# Patient Record
Sex: FEMALE | Race: WHITE | NOT HISPANIC OR LATINO | Employment: UNEMPLOYED | URBAN - NONMETROPOLITAN AREA
[De-identification: names, ages, dates, MRNs, and addresses within clinical notes are randomized per-mention and may not be internally consistent; named-entity substitution may affect disease eponyms.]

---

## 2017-01-01 ENCOUNTER — HOSPITAL ENCOUNTER (EMERGENCY)
Facility: HOSPITAL | Age: 82
Discharge: HOME | End: 2017-04-16
Attending: EMERGENCY MEDICINE | Admitting: EMERGENCY MEDICINE

## 2017-01-01 ENCOUNTER — APPOINTMENT (OUTPATIENT)
Dept: CARDIOLOGY | Facility: HOSPITAL | Age: 82
End: 2017-01-01
Attending: INTERNAL MEDICINE

## 2017-01-01 ENCOUNTER — LAB REQUISITION (OUTPATIENT)
Dept: LAB | Facility: HOSPITAL | Age: 82
End: 2017-01-01

## 2017-01-01 ENCOUNTER — HOSPITAL ENCOUNTER (INPATIENT)
Facility: HOSPITAL | Age: 82
LOS: 4 days | Discharge: NURSING, REHAB OR LONG-TERM CARE | End: 2017-07-05
Attending: EMERGENCY MEDICINE | Admitting: INTERNAL MEDICINE

## 2017-01-01 ENCOUNTER — APPOINTMENT (OUTPATIENT)
Dept: GENERAL RADIOLOGY | Facility: HOSPITAL | Age: 82
End: 2017-01-01

## 2017-01-01 ENCOUNTER — APPOINTMENT (OUTPATIENT)
Dept: CT IMAGING | Facility: HOSPITAL | Age: 82
End: 2017-01-01

## 2017-01-01 ENCOUNTER — HOSPITAL ENCOUNTER (OUTPATIENT)
Dept: OTHER | Facility: HOSPITAL | Age: 82
Discharge: HOME | End: 2017-01-11
Attending: FAMILY MEDICINE | Admitting: FAMILY MEDICINE

## 2017-01-01 ENCOUNTER — APPOINTMENT (OUTPATIENT)
Dept: ULTRASOUND IMAGING | Facility: HOSPITAL | Age: 82
End: 2017-01-01

## 2017-01-01 VITALS
HEIGHT: 62 IN | SYSTOLIC BLOOD PRESSURE: 114 MMHG | DIASTOLIC BLOOD PRESSURE: 56 MMHG | WEIGHT: 97.6 LBS | BODY MASS INDEX: 17.96 KG/M2 | TEMPERATURE: 97 F | OXYGEN SATURATION: 95 % | RESPIRATION RATE: 18 BRPM | HEART RATE: 100 BPM

## 2017-01-01 VITALS
WEIGHT: 102.07 LBS | HEIGHT: 60 IN | BODY MASS INDEX: 20.04 KG/M2 | OXYGEN SATURATION: 93 % | TEMPERATURE: 97.8 F | HEART RATE: 90 BPM | DIASTOLIC BLOOD PRESSURE: 65 MMHG | RESPIRATION RATE: 16 BRPM | SYSTOLIC BLOOD PRESSURE: 143 MMHG

## 2017-01-01 DIAGNOSIS — Z74.09 IMPAIRED FUNCTIONAL MOBILITY, BALANCE, GAIT, AND ENDURANCE: ICD-10-CM

## 2017-01-01 DIAGNOSIS — N39.0 SEPSIS SECONDARY TO UTI (HCC): ICD-10-CM

## 2017-01-01 DIAGNOSIS — I26.99 PULMONARY EMBOLISM ON RIGHT (HCC): ICD-10-CM

## 2017-01-01 DIAGNOSIS — E86.0 DEHYDRATION: ICD-10-CM

## 2017-01-01 DIAGNOSIS — I82.412 ACUTE DEEP VEIN THROMBOSIS (DVT) OF LEFT FEMORAL VEIN (HCC): ICD-10-CM

## 2017-01-01 DIAGNOSIS — R53.1 WEAKNESS: Primary | ICD-10-CM

## 2017-01-01 DIAGNOSIS — N39.0 URINARY TRACT INFECTION: ICD-10-CM

## 2017-01-01 DIAGNOSIS — A41.9 SEPSIS SECONDARY TO UTI (HCC): ICD-10-CM

## 2017-01-01 DIAGNOSIS — I26.99 OTHER PULMONARY EMBOLISM WITHOUT ACUTE COR PULMONALE (HCC): ICD-10-CM

## 2017-01-01 DIAGNOSIS — I26.99 OTHER ACUTE PULMONARY EMBOLISM WITHOUT ACUTE COR PULMONALE (HCC): Primary | ICD-10-CM

## 2017-01-01 LAB
ALBUMIN SERPL-MCNC: 2.9 G/DL (ref 3.4–4.8)
ALBUMIN SERPL-MCNC: 3 G/DL (ref 3.4–4.8)
ALBUMIN SERPL-MCNC: 3.1 G/DL (ref 3.4–4.8)
ALBUMIN SERPL-MCNC: 3.7 G/DL (ref 3.4–4.8)
ALBUMIN/GLOB SERPL: 1.1 G/DL (ref 1.1–1.8)
ALBUMIN/GLOB SERPL: 1.2 G/DL (ref 1.1–1.8)
ALBUMIN/GLOB SERPL: 1.2 G/DL (ref 1.1–1.8)
ALBUMIN/GLOB SERPL: 1.7 G/DL (ref 1.1–1.8)
ALP SERPL-CCNC: 55 U/L (ref 38–126)
ALP SERPL-CCNC: 58 U/L (ref 38–126)
ALP SERPL-CCNC: 59 U/L (ref 38–126)
ALP SERPL-CCNC: 69 U/L (ref 38–126)
ALT SERPL W P-5'-P-CCNC: 21 U/L (ref 9–52)
ALT SERPL W P-5'-P-CCNC: 25 U/L (ref 9–52)
ALT SERPL W P-5'-P-CCNC: 31 U/L (ref 9–52)
ALT SERPL W P-5'-P-CCNC: 32 U/L (ref 9–52)
ANION GAP SERPL CALCULATED.3IONS-SCNC: 10 MMOL/L (ref 5–15)
ANION GAP SERPL CALCULATED.3IONS-SCNC: 11 MMOL/L (ref 5–15)
ANION GAP SERPL CALCULATED.3IONS-SCNC: 11 MMOL/L (ref 5–15)
ANION GAP SERPL CALCULATED.3IONS-SCNC: 8 MMOL/L (ref 5–15)
ANION GAP SERPL CALCULATED.3IONS-SCNC: 9 MMOL/L (ref 5–15)
APTT PPP: 26 SECONDS (ref 20–40.3)
AST SERPL-CCNC: 26 U/L (ref 14–36)
AST SERPL-CCNC: 30 U/L (ref 14–36)
AST SERPL-CCNC: 30 U/L (ref 14–36)
AST SERPL-CCNC: 37 U/L (ref 14–36)
BACTERIA SPEC AEROBE CULT: ABNORMAL
BACTERIA SPEC AEROBE CULT: NORMAL
BACTERIA SPEC AEROBE CULT: NORMAL
BACTERIA UR QL AUTO: ABNORMAL /HPF
BACTERIA UR QL AUTO: ABNORMAL /HPF
BASOPHILS # BLD AUTO: 0.02 10*3/MM3 (ref 0–0.2)
BASOPHILS # BLD AUTO: 0.03 10*3/MM3 (ref 0–0.2)
BASOPHILS # BLD AUTO: 0.03 10*3/MM3 (ref 0–0.2)
BASOPHILS # BLD AUTO: 0.04 X1000/UL (ref 0–0.2)
BASOPHILS # BLD AUTO: 0.05 10*3/MM3 (ref 0–0.2)
BASOPHILS NFR BLD AUTO: 0.2 % (ref 0–2)
BASOPHILS NFR BLD AUTO: 0.4 % (ref 0–2)
BASOPHILS NFR BLD AUTO: 0.6 % (ref 0–2)
BH CV ECHO MEAS - ACS: 2.6 CM
BH CV ECHO MEAS - AO MAX PG (FULL): 6.4 MMHG
BH CV ECHO MEAS - AO MAX PG: 12.4 MMHG
BH CV ECHO MEAS - AO MEAN PG (FULL): 2.4 MMHG
BH CV ECHO MEAS - AO MEAN PG: 6.2 MMHG
BH CV ECHO MEAS - AO ROOT AREA: 10 CM^2
BH CV ECHO MEAS - AO ROOT DIAM: 3.6 CM
BH CV ECHO MEAS - AO V2 MAX: 176.4 CM/SEC
BH CV ECHO MEAS - AO V2 MEAN: 110.6 CM/SEC
BH CV ECHO MEAS - AO V2 VTI: 25.3 CM
BH CV ECHO MEAS - AVA(I,A): 2.7 CM^2
BH CV ECHO MEAS - AVA(I,D): 2.7 CM^2
BH CV ECHO MEAS - AVA(V,A): 2.1 CM^2
BH CV ECHO MEAS - AVA(V,D): 2.1 CM^2
BH CV ECHO MEAS - EDV(CUBED): 43.7 ML
BH CV ECHO MEAS - EDV(TEICH): 51.6 ML
BH CV ECHO MEAS - EF(CUBED): 83 %
BH CV ECHO MEAS - EF(TEICH): 76.8 %
BH CV ECHO MEAS - ESV(CUBED): 7.4 ML
BH CV ECHO MEAS - ESV(TEICH): 12 ML
BH CV ECHO MEAS - FS: 44.6 %
BH CV ECHO MEAS - IVS/LVPW: 0.9
BH CV ECHO MEAS - IVSD: 0.81 CM
BH CV ECHO MEAS - LA DIMENSION: 2.3 CM
BH CV ECHO MEAS - LA/AO: 0.65
BH CV ECHO MEAS - LV MASS(C)D: 83.5 GRAMS
BH CV ECHO MEAS - LV MAX PG: 6.1 MMHG
BH CV ECHO MEAS - LV MEAN PG: 3.8 MMHG
BH CV ECHO MEAS - LV V1 MAX: 123.1 CM/SEC
BH CV ECHO MEAS - LV V1 MEAN: 91.5 CM/SEC
BH CV ECHO MEAS - LV V1 VTI: 23.2 CM
BH CV ECHO MEAS - LVIDD: 3.5 CM
BH CV ECHO MEAS - LVIDS: 2 CM
BH CV ECHO MEAS - LVOT AREA (M): 2.8 CM^2
BH CV ECHO MEAS - LVOT AREA: 3 CM^2
BH CV ECHO MEAS - LVOT DIAM: 1.9 CM
BH CV ECHO MEAS - LVPWD: 0.9 CM
BH CV ECHO MEAS - MR MAX PG: 99.8 MMHG
BH CV ECHO MEAS - MR MAX VEL: 499.5 CM/SEC
BH CV ECHO MEAS - MV A MAX VEL: 131.3 CM/SEC
BH CV ECHO MEAS - MV DEC SLOPE: 469.9 CM/SEC^2
BH CV ECHO MEAS - MV E MAX VEL: 91.3 CM/SEC
BH CV ECHO MEAS - MV E/A: 0.7
BH CV ECHO MEAS - MV MAX PG: 9.9 MMHG
BH CV ECHO MEAS - MV MEAN PG: 4.3 MMHG
BH CV ECHO MEAS - MV P1/2T MAX VEL: 103.4 CM/SEC
BH CV ECHO MEAS - MV P1/2T: 64.4 MSEC
BH CV ECHO MEAS - MV V2 MAX: 157.2 CM/SEC
BH CV ECHO MEAS - MV V2 MEAN: 97.4 CM/SEC
BH CV ECHO MEAS - MV V2 VTI: 21.3 CM
BH CV ECHO MEAS - MVA P1/2T LCG: 2.1 CM^2
BH CV ECHO MEAS - MVA(P1/2T): 3.4 CM^2
BH CV ECHO MEAS - MVA(VTI): 3.2 CM^2
BH CV ECHO MEAS - PA MAX PG: 3.2 MMHG
BH CV ECHO MEAS - PA V2 MAX: 89.6 CM/SEC
BH CV ECHO MEAS - RAP SYSTOLE: 10 MMHG
BH CV ECHO MEAS - RVDD: 1.9 CM
BH CV ECHO MEAS - SV(AO): 253.7 ML
BH CV ECHO MEAS - SV(CUBED): 36.2 ML
BH CV ECHO MEAS - SV(LVOT): 69 ML
BH CV ECHO MEAS - SV(TEICH): 39.6 ML
BILIRUB SERPL-MCNC: 0.2 MG/DL (ref 0.2–1.3)
BILIRUB SERPL-MCNC: 0.2 MG/DL (ref 0.2–1.3)
BILIRUB SERPL-MCNC: 0.3 MG/DL (ref 0.2–1.3)
BILIRUB SERPL-MCNC: 0.5 MG/DL (ref 0.2–1.3)
BILIRUB UR QL STRIP: NEGATIVE
BUN BLD-MCNC: 13 MG/DL (ref 7–21)
BUN BLD-MCNC: 15 MG/DL (ref 7–21)
BUN BLD-MCNC: 18 MG/DL (ref 7–21)
BUN BLD-MCNC: 7 MG/DL (ref 7–21)
BUN BLD-MCNC: 8 MG/DL (ref 7–21)
BUN/CREAT SERPL: 14.4 (ref 7–25)
BUN/CREAT SERPL: 15.8 (ref 7–25)
BUN/CREAT SERPL: 21.7 (ref 7–25)
BUN/CREAT SERPL: 8.9 (ref 7–25)
BUN/CREAT SERPL: 9.9 (ref 7–25)
CALCIUM SPEC-SCNC: 8.2 MG/DL (ref 8.4–10.2)
CALCIUM SPEC-SCNC: 8.3 MG/DL (ref 8.4–10.2)
CALCIUM SPEC-SCNC: 8.3 MG/DL (ref 8.4–10.2)
CALCIUM SPEC-SCNC: 8.5 MG/DL (ref 8.4–10.2)
CALCIUM SPEC-SCNC: 9.2 MG/DL (ref 8.4–10.2)
CHLORIDE SERPL-SCNC: 107 MMOL/L (ref 95–110)
CHLORIDE SERPL-SCNC: 109 MMOL/L (ref 95–110)
CHLORIDE SERPL-SCNC: 109 MMOL/L (ref 95–110)
CHLORIDE SERPL-SCNC: 110 MMOL/L (ref 95–110)
CHLORIDE SERPL-SCNC: 99 MMOL/L (ref 95–110)
CK MB SERPL-CCNC: 2.35 NG/ML (ref 0–5)
CK MB SERPL-CCNC: 2.82 NG/ML (ref 0–5)
CK SERPL-CCNC: 66 U/L (ref 30–135)
CK SERPL-CCNC: 77 U/L (ref 30–135)
CLARITY UR: ABNORMAL
CLARITY UR: CLEAR
CLARITY UR: CLEAR
CLUMPED PLATELETS: NORMAL
CO2 SERPL-SCNC: 21 MMOL/L (ref 22–31)
CO2 SERPL-SCNC: 22 MMOL/L (ref 22–31)
CO2 SERPL-SCNC: 22 MMOL/L (ref 22–31)
CO2 SERPL-SCNC: 23 MMOL/L (ref 22–31)
CO2 SERPL-SCNC: 25 MMOL/L (ref 22–31)
COLOR UR: YELLOW
CONV AUTO IG#: 0.03 X1000/UL (ref 0.01–0.02)
CONV AUTO IG%: 0.3 % (ref 0–0.5)
CREAT BLD-MCNC: 0.79 MG/DL (ref 0.5–1)
CREAT BLD-MCNC: 0.81 MG/DL (ref 0.5–1)
CREAT BLD-MCNC: 0.83 MG/DL (ref 0.5–1)
CREAT BLD-MCNC: 0.9 MG/DL (ref 0.5–1)
CREAT BLD-MCNC: 0.95 MG/DL (ref 0.5–1)
D-DIMER, QUANTITATIVE (MAD,POW, STR): >4000 NG/ML (FEU) (ref 0–470)
D-LACTATE SERPL-SCNC: 1.1 MMOL/L (ref 0.5–2)
D-LACTATE SERPL-SCNC: 1.9 MMOL/L (ref 0.5–2)
D-LACTATE SERPL-SCNC: 2.2 MMOL/L (ref 0.5–2)
DEPRECATED RDW RBC AUTO: 46.5 FL (ref 36.4–46.3)
DEPRECATED RDW RBC AUTO: 49.3 FL (ref 36.4–46.3)
DEPRECATED RDW RBC AUTO: 49.4 FL (ref 36.4–46.3)
DEPRECATED RDW RBC AUTO: 50.5 FL (ref 36.4–46.3)
DEPRECATED RDW RBC AUTO: 51 FL (ref 36.4–46.3)
EOSINOPHIL # BLD AUTO: 0 10*3/MM3 (ref 0–0.7)
EOSINOPHIL # BLD AUTO: 0.06 10*3/MM3 (ref 0–0.7)
EOSINOPHIL # BLD AUTO: 0.14 X1000/UL (ref 0–0.7)
EOSINOPHIL # BLD AUTO: 0.15 10*3/MM3 (ref 0–0.7)
EOSINOPHIL # BLD AUTO: 0.15 10*3/MM3 (ref 0–0.7)
EOSINOPHIL NFR BLD AUTO: 0 % (ref 0–7)
EOSINOPHIL NFR BLD AUTO: 0.7 % (ref 0–7)
EOSINOPHIL NFR BLD AUTO: 1.5 % (ref 0–7)
EOSINOPHIL NFR BLD AUTO: 1.8 % (ref 0–7)
EOSINOPHIL NFR BLD AUTO: 1.9 % (ref 0–7)
ERYTHROCYTE [DISTWIDTH] IN BLOOD BY AUTOMATED COUNT: 13.6 % (ref 11.5–14.5)
ERYTHROCYTE [DISTWIDTH] IN BLOOD BY AUTOMATED COUNT: 13.8 % (ref 11.5–14.5)
ERYTHROCYTE [DISTWIDTH] IN BLOOD BY AUTOMATED COUNT: 13.8 % (ref 11.5–14.5)
ERYTHROCYTE [DISTWIDTH] IN BLOOD BY AUTOMATED COUNT: 14.2 % (ref 11.5–14.5)
ERYTHROCYTE [DISTWIDTH] IN BLOOD BY AUTOMATED COUNT: 14.3 % (ref 11.5–14.5)
ERYTHROCYTE [DISTWIDTH] IN BLOOD: 13.4 % (ref 11.5–14.5)
ERYTHROCYTE [SEDIMENTATION RATE] IN BLOOD: 8 MM/HR (ref 0–20)
GFR SERPL CREATININE-BSD FRML MDRD: 55 ML/MIN/1.73 (ref 39–90)
GFR SERPL CREATININE-BSD FRML MDRD: 59 ML/MIN/1.73 (ref 39–90)
GFR SERPL CREATININE-BSD FRML MDRD: 65 ML/MIN/1.73 (ref 39–90)
GFR SERPL CREATININE-BSD FRML MDRD: 66 ML/MIN/1.73 (ref 39–90)
GFR SERPL CREATININE-BSD FRML MDRD: 68 ML/MIN/1.73 (ref 60–90)
GLOBULIN UR ELPH-MCNC: 2.2 GM/DL (ref 2.3–3.5)
GLOBULIN UR ELPH-MCNC: 2.5 GM/DL (ref 2.3–3.5)
GLOBULIN UR ELPH-MCNC: 2.6 GM/DL (ref 2.3–3.5)
GLOBULIN UR ELPH-MCNC: 2.6 GM/DL (ref 2.3–3.5)
GLUCOSE BLD-MCNC: 115 MG/DL (ref 60–100)
GLUCOSE BLD-MCNC: 133 MG/DL (ref 60–100)
GLUCOSE BLD-MCNC: 78 MG/DL (ref 60–100)
GLUCOSE BLD-MCNC: 81 MG/DL (ref 60–100)
GLUCOSE BLD-MCNC: 83 MG/DL (ref 60–100)
GLUCOSE BLDC GLUCOMTR-MCNC: 100 MG/DL (ref 70–130)
GLUCOSE BLDC GLUCOMTR-MCNC: 105 MG/DL (ref 70–130)
GLUCOSE BLDC GLUCOMTR-MCNC: 108 MG/DL (ref 70–130)
GLUCOSE BLDC GLUCOMTR-MCNC: 113 MG/DL (ref 70–130)
GLUCOSE BLDC GLUCOMTR-MCNC: 114 MG/DL (ref 70–130)
GLUCOSE BLDC GLUCOMTR-MCNC: 130 MG/DL (ref 70–130)
GLUCOSE BLDC GLUCOMTR-MCNC: 84 MG/DL (ref 70–130)
GLUCOSE BLDC GLUCOMTR-MCNC: 87 MG/DL (ref 70–130)
GLUCOSE BLDC GLUCOMTR-MCNC: 89 MG/DL (ref 70–130)
GLUCOSE BLDC GLUCOMTR-MCNC: 92 MG/DL (ref 70–130)
GLUCOSE BLDC GLUCOMTR-MCNC: 95 MG/DL (ref 70–130)
GLUCOSE UR STRIP-MCNC: NEGATIVE MG/DL
GRANULOCYTES # BLD AUTO: 6.88 X1000/UL (ref 2–8.6)
GRANULOCYTES NFR BLD AUTO: 73.4 % (ref 37–80)
HCT VFR BLD AUTO: 29.4 % (ref 35–45)
HCT VFR BLD AUTO: 29.6 % (ref 35–45)
HCT VFR BLD AUTO: 31.9 % (ref 35–45)
HCT VFR BLD AUTO: 33.5 % (ref 35–45)
HCT VFR BLD AUTO: 38 % (ref 35–45)
HCT VFR BLD CALC: 36 % (ref 35–45)
HGB BLD-MCNC: 10.1 G/DL (ref 12–15.5)
HGB BLD-MCNC: 11.3 G/DL (ref 12–15.5)
HGB BLD-MCNC: 11.5 GM/DL (ref 12–15.5)
HGB BLD-MCNC: 12 G/DL (ref 12–15.5)
HGB BLD-MCNC: 9.6 G/DL (ref 12–15.5)
HGB BLD-MCNC: 9.7 G/DL (ref 12–15.5)
HGB UR QL STRIP.AUTO: ABNORMAL
HGB UR QL STRIP.AUTO: ABNORMAL
HGB UR QL STRIP.AUTO: NEGATIVE
HOLD SPECIMEN: NORMAL
HYALINE CASTS UR QL AUTO: ABNORMAL /LPF
HYALINE CASTS UR QL AUTO: ABNORMAL /LPF
IMM GRANULOCYTES # BLD: 0.01 10*3/MM3 (ref 0–0.02)
IMM GRANULOCYTES # BLD: 0.01 10*3/MM3 (ref 0–0.02)
IMM GRANULOCYTES # BLD: 0.02 10*3/MM3 (ref 0–0.02)
IMM GRANULOCYTES # BLD: 0.04 10*3/MM3 (ref 0–0.02)
IMM GRANULOCYTES NFR BLD: 0.1 % (ref 0–0.5)
IMM GRANULOCYTES NFR BLD: 0.1 % (ref 0–0.5)
IMM GRANULOCYTES NFR BLD: 0.2 % (ref 0–0.5)
IMM GRANULOCYTES NFR BLD: 0.5 % (ref 0–0.5)
INR PPP: 0.88 (ref 0.8–1.2)
INR PPP: 1 (ref 0.8–1.2)
INR PPP: 1.02 (ref 0.8–1.2)
INR PPP: 1.07 (ref 0.8–1.2)
INR PPP: 1.17 (ref 0.8–1.2)
INR PPP: 1.32 (ref 0.8–1.2)
INR PPP: 2.21 (ref 0.8–1.2)
INR PPP: 3.19 (ref 0.8–1.2)
KETONES UR QL STRIP: NEGATIVE
LEUKOCYTE ESTERASE UR QL STRIP.AUTO: ABNORMAL
LEUKOCYTE ESTERASE UR QL STRIP.AUTO: ABNORMAL
LEUKOCYTE ESTERASE UR QL STRIP.AUTO: NEGATIVE
LIPASE SERPL-CCNC: 40 U/L (ref 23–300)
LYMPHOCYTES # BLD AUTO: 0.5 10*3/MM3 (ref 0.6–4.2)
LYMPHOCYTES # BLD AUTO: 1.05 10*3/MM3 (ref 0.6–4.2)
LYMPHOCYTES # BLD AUTO: 1.1 10*3/MM3 (ref 0.6–4.2)
LYMPHOCYTES # BLD AUTO: 1.54 10*3/MM3 (ref 0.6–4.2)
LYMPHOCYTES # BLD AUTO: 1.67 X1000/UL (ref 0.6–4.2)
LYMPHOCYTES NFR BLD AUTO: 12.3 % (ref 10–50)
LYMPHOCYTES NFR BLD AUTO: 14.1 % (ref 10–50)
LYMPHOCYTES NFR BLD AUTO: 17.8 % (ref 10–50)
LYMPHOCYTES NFR BLD AUTO: 18 % (ref 10–50)
LYMPHOCYTES NFR BLD AUTO: 5.9 % (ref 10–50)
MAGNESIUM SERPL-MCNC: 1.4 MG/DL (ref 1.6–2.3)
MAGNESIUM SERPL-MCNC: 1.5 MG/DL (ref 1.6–2.3)
MCH RBC QN AUTO: 31 PG (ref 26.5–34)
MCH RBC QN AUTO: 31.1 PG (ref 26.5–34)
MCH RBC QN AUTO: 31.7 PG (ref 26.5–34)
MCH RBC QN AUTO: 31.8 PG (ref 26.5–34)
MCH RBC QN AUTO: 32.1 PG (ref 26.5–34)
MCH RBC QN: 32.3 PG (ref 26–34)
MCHC RBC AUTO-ENTMCNC: 31.6 G/DL (ref 31.4–36)
MCHC RBC AUTO-ENTMCNC: 31.7 G/DL (ref 31.4–36)
MCHC RBC AUTO-ENTMCNC: 32.7 G/DL (ref 31.4–36)
MCHC RBC AUTO-ENTMCNC: 32.8 G/DL (ref 31.4–36)
MCHC RBC AUTO-ENTMCNC: 33.7 G/DL (ref 31.4–36)
MCHC RBC-ENTMCNC: 31.9 GM/DL (ref 31.4–36)
MCV RBC AUTO: 93.8 FL (ref 80–98)
MCV RBC AUTO: 97 FL (ref 80–98)
MCV RBC AUTO: 98.2 FL (ref 80–98)
MCV RBC AUTO: 98.2 FL (ref 80–98)
MCV RBC AUTO: 98.3 FL (ref 80–98)
MCV RBC: 101.1 FL (ref 80–98)
MONOCYTES # BLD AUTO: 0.39 10*3/MM3 (ref 0–0.9)
MONOCYTES # BLD AUTO: 0.52 10*3/MM3 (ref 0–0.9)
MONOCYTES # BLD AUTO: 0.62 X1000/UL (ref 0–0.9)
MONOCYTES # BLD AUTO: 0.65 10*3/MM3 (ref 0–0.9)
MONOCYTES # BLD AUTO: 0.74 10*3/MM3 (ref 0–0.9)
MONOCYTES NFR BLD AUTO: 4.6 % (ref 0–12)
MONOCYTES NFR BLD AUTO: 6.6 % (ref 0–12)
MONOCYTES NFR BLD AUTO: 6.7 % (ref 0–12)
MONOCYTES NFR BLD AUTO: 7.6 % (ref 0–12)
MONOCYTES NFR BLD AUTO: 8.6 % (ref 0–12)
MUCOUS THREADS URNS QL MICRO: ABNORMAL /HPF
NEUTROPHILS # BLD AUTO: 5.98 10*3/MM3 (ref 2–8.6)
NEUTROPHILS # BLD AUTO: 6.07 10*3/MM3 (ref 2–8.6)
NEUTROPHILS # BLD AUTO: 6.72 10*3/MM3 (ref 2–8.6)
NEUTROPHILS # BLD AUTO: 7.55 10*3/MM3 (ref 2–8.6)
NEUTROPHILS NFR BLD AUTO: 70.8 % (ref 37–80)
NEUTROPHILS NFR BLD AUTO: 76.8 % (ref 37–80)
NEUTROPHILS NFR BLD AUTO: 78.9 % (ref 37–80)
NEUTROPHILS NFR BLD AUTO: 88.8 % (ref 37–80)
NITRITE UR QL STRIP: NEGATIVE
NRBC BLD MANUAL-RTO: 0 /100 WBC (ref 0–0)
PH UR STRIP.AUTO: 5.5 [PH] (ref 5–9)
PH UR STRIP.AUTO: 6 [PH] (ref 5–9)
PH UR STRIP.AUTO: 7 [PH] (ref 5–9)
PLATELET # BLD AUTO: 250 10*3/MM3 (ref 150–450)
PLATELET # BLD AUTO: 252 10*3/MM3 (ref 150–450)
PLATELET # BLD AUTO: 285 10*3/MM3 (ref 150–450)
PLATELET # BLD AUTO: 295 10*3/MM3 (ref 150–450)
PLATELET # BLD AUTO: 323 10*3/MM3 (ref 150–450)
PLATELET # BLD: 363 X1000/MM3 (ref 150–450)
PMV BLD AUTO: 7.8 FL (ref 8–12)
PMV BLD AUTO: 8.7 FL (ref 8–12)
PMV BLD AUTO: 9.2 FL (ref 8–12)
PMV BLD AUTO: 9.7 FL (ref 8–12)
PMV BLD AUTO: 9.7 FL (ref 8–12)
PMV BLD: 9 FL (ref 8–12)
POTASSIUM BLD-SCNC: 3.6 MMOL/L (ref 3.5–5.1)
POTASSIUM BLD-SCNC: 3.8 MMOL/L (ref 3.5–5.1)
POTASSIUM BLD-SCNC: 4 MMOL/L (ref 3.5–5.1)
POTASSIUM BLD-SCNC: 4.3 MMOL/L (ref 3.5–5.1)
POTASSIUM BLD-SCNC: 4.6 MMOL/L (ref 3.5–5.1)
PROT SERPL-MCNC: 5.5 G/DL (ref 6.3–8.6)
PROT SERPL-MCNC: 5.6 G/DL (ref 6.3–8.6)
PROT SERPL-MCNC: 5.6 G/DL (ref 6.3–8.6)
PROT SERPL-MCNC: 5.9 G/DL (ref 6.3–8.6)
PROT UR QL STRIP: ABNORMAL
PROT UR QL STRIP: NEGATIVE
PROT UR QL STRIP: NEGATIVE
PROTHROMBIN TIME: 11.8 SECONDS (ref 11.1–15.3)
PROTHROMBIN TIME: 13.1 SECONDS (ref 11.1–15.3)
PROTHROMBIN TIME: 13.3 SECONDS (ref 11.1–15.3)
PROTHROMBIN TIME: 13.8 SECONDS (ref 11.1–15.3)
PROTHROMBIN TIME: 14.9 SECONDS (ref 11.1–15.3)
PROTHROMBIN TIME: 16.4 SECONDS (ref 11.1–15.3)
PROTHROMBIN TIME: 24.7 SECONDS (ref 11.1–15.3)
PROTHROMBIN TIME: 33.1 SECONDS (ref 11.1–15.3)
RBC # BLD AUTO: 2.99 10*6/MM3 (ref 3.77–5.16)
RBC # BLD AUTO: 3.05 10*6/MM3 (ref 3.77–5.16)
RBC # BLD AUTO: 3.25 10*6/MM3 (ref 3.77–5.16)
RBC # BLD AUTO: 3.57 10*6/MM3 (ref 3.77–5.16)
RBC # BLD AUTO: 3.87 10*6/MM3 (ref 3.77–5.16)
RBC # BLD: 3.56 MEGA/MM3 (ref 3.77–5.16)
RBC # UR: ABNORMAL /HPF
RBC # UR: ABNORMAL /HPF
RBC MORPH BLD: NORMAL
REF LAB TEST METHOD: ABNORMAL
REF LAB TEST METHOD: ABNORMAL
SMALL PLATELETS BLD QL SMEAR: ADEQUATE
SODIUM BLD-SCNC: 135 MMOL/L (ref 137–145)
SODIUM BLD-SCNC: 138 MMOL/L (ref 137–145)
SODIUM BLD-SCNC: 140 MMOL/L (ref 137–145)
SODIUM BLD-SCNC: 141 MMOL/L (ref 137–145)
SODIUM BLD-SCNC: 142 MMOL/L (ref 137–145)
SP GR UR STRIP: 1.01 (ref 1–1.03)
SP GR UR STRIP: 1.01 (ref 1–1.03)
SP GR UR STRIP: 1.02 (ref 1–1.03)
SQUAMOUS #/AREA URNS HPF: ABNORMAL /HPF
SQUAMOUS #/AREA URNS HPF: ABNORMAL /HPF
T4 FREE SERPL-MCNC: 1.27 NG/DL (ref 0.78–2.19)
TROPONIN I SERPL-MCNC: 0.02 NG/ML
TROPONIN I SERPL-MCNC: <0.012 NG/ML
TSH SERPL DL<=0.05 MIU/L-ACNC: 1.21 MIU/ML (ref 0.46–4.68)
TSH SERPL DL<=0.05 MIU/L-ACNC: 4.39 MIU/ML (ref 0.46–4.68)
UROBILINOGEN UR QL STRIP: ABNORMAL
UROBILINOGEN UR QL STRIP: ABNORMAL
UROBILINOGEN UR QL STRIP: NORMAL
WBC # BLD: 9.4 X1000/UL (ref 3.2–9.8)
WBC MORPH BLD: NORMAL
WBC NRBC COR # BLD: 7.79 10*3/MM3 (ref 3.2–9.8)
WBC NRBC COR # BLD: 7.87 10*3/MM3 (ref 3.2–9.8)
WBC NRBC COR # BLD: 8.5 10*3/MM3 (ref 3.2–9.8)
WBC NRBC COR # BLD: 8.52 10*3/MM3 (ref 3.2–9.8)
WBC NRBC COR # BLD: 8.57 10*3/MM3 (ref 3.2–9.8)
WBC UR QL AUTO: ABNORMAL /HPF
WBC UR QL AUTO: ABNORMAL /HPF
WHOLE BLOOD HOLD SPECIMEN: NORMAL

## 2017-01-01 PROCEDURE — 85379 FIBRIN DEGRADATION QUANT: CPT | Performed by: EMERGENCY MEDICINE

## 2017-01-01 PROCEDURE — 83605 ASSAY OF LACTIC ACID: CPT | Performed by: EMERGENCY MEDICINE

## 2017-01-01 PROCEDURE — 83735 ASSAY OF MAGNESIUM: CPT | Performed by: INTERNAL MEDICINE

## 2017-01-01 PROCEDURE — 94799 UNLISTED PULMONARY SVC/PX: CPT

## 2017-01-01 PROCEDURE — 93010 ELECTROCARDIOGRAM REPORT: CPT | Performed by: INTERNAL MEDICINE

## 2017-01-01 PROCEDURE — 74022 RADEX COMPL AQT ABD SERIES: CPT

## 2017-01-01 PROCEDURE — 82962 GLUCOSE BLOOD TEST: CPT

## 2017-01-01 PROCEDURE — 25010000002 MORPHINE SULFATE (PF) 2 MG/ML SOLUTION: Performed by: INTERNAL MEDICINE

## 2017-01-01 PROCEDURE — 85610 PROTHROMBIN TIME: CPT | Performed by: PHYSICIAN ASSISTANT

## 2017-01-01 PROCEDURE — 94760 N-INVAS EAR/PLS OXIMETRY 1: CPT

## 2017-01-01 PROCEDURE — 71010 HC CHEST PA OR AP: CPT

## 2017-01-01 PROCEDURE — 25010000002 ENOXAPARIN PER 10 MG: Performed by: INTERNAL MEDICINE

## 2017-01-01 PROCEDURE — 99285 EMERGENCY DEPT VISIT HI MDM: CPT

## 2017-01-01 PROCEDURE — 97162 PT EVAL MOD COMPLEX 30 MIN: CPT

## 2017-01-01 PROCEDURE — 82550 ASSAY OF CK (CPK): CPT | Performed by: EMERGENCY MEDICINE

## 2017-01-01 PROCEDURE — 25010000002 ENOXAPARIN PER 10 MG: Performed by: EMERGENCY MEDICINE

## 2017-01-01 PROCEDURE — 93306 TTE W/DOPPLER COMPLETE: CPT | Performed by: INTERNAL MEDICINE

## 2017-01-01 PROCEDURE — 63710000001 PREDNISONE PER 5 MG: Performed by: INTERNAL MEDICINE

## 2017-01-01 PROCEDURE — 87077 CULTURE AEROBIC IDENTIFY: CPT | Performed by: FAMILY MEDICINE

## 2017-01-01 PROCEDURE — 85007 BL SMEAR W/DIFF WBC COUNT: CPT | Performed by: EMERGENCY MEDICINE

## 2017-01-01 PROCEDURE — 84443 ASSAY THYROID STIM HORMONE: CPT | Performed by: EMERGENCY MEDICINE

## 2017-01-01 PROCEDURE — 85730 THROMBOPLASTIN TIME PARTIAL: CPT | Performed by: EMERGENCY MEDICINE

## 2017-01-01 PROCEDURE — 85610 PROTHROMBIN TIME: CPT | Performed by: INTERNAL MEDICINE

## 2017-01-01 PROCEDURE — 87086 URINE CULTURE/COLONY COUNT: CPT | Performed by: FAMILY MEDICINE

## 2017-01-01 PROCEDURE — 94640 AIRWAY INHALATION TREATMENT: CPT

## 2017-01-01 PROCEDURE — 82553 CREATINE MB FRACTION: CPT | Performed by: EMERGENCY MEDICINE

## 2017-01-01 PROCEDURE — 85610 PROTHROMBIN TIME: CPT | Performed by: EMERGENCY MEDICINE

## 2017-01-01 PROCEDURE — 85025 COMPLETE CBC W/AUTO DIFF WBC: CPT | Performed by: EMERGENCY MEDICINE

## 2017-01-01 PROCEDURE — 81003 URINALYSIS AUTO W/O SCOPE: CPT | Performed by: FAMILY MEDICINE

## 2017-01-01 PROCEDURE — 81001 URINALYSIS AUTO W/SCOPE: CPT | Performed by: EMERGENCY MEDICINE

## 2017-01-01 PROCEDURE — 25010000002 LEVOFLOXACIN PER 250 MG: Performed by: HOSPITALIST

## 2017-01-01 PROCEDURE — 99284 EMERGENCY DEPT VISIT MOD MDM: CPT

## 2017-01-01 PROCEDURE — 93005 ELECTROCARDIOGRAM TRACING: CPT | Performed by: EMERGENCY MEDICINE

## 2017-01-01 PROCEDURE — G8978 MOBILITY CURRENT STATUS: HCPCS

## 2017-01-01 PROCEDURE — 85025 COMPLETE CBC W/AUTO DIFF WBC: CPT | Performed by: INTERNAL MEDICINE

## 2017-01-01 PROCEDURE — G0378 HOSPITAL OBSERVATION PER HR: HCPCS

## 2017-01-01 PROCEDURE — 81003 URINALYSIS AUTO W/O SCOPE: CPT | Performed by: EMERGENCY MEDICINE

## 2017-01-01 PROCEDURE — 85610 PROTHROMBIN TIME: CPT | Performed by: FAMILY MEDICINE

## 2017-01-01 PROCEDURE — 83690 ASSAY OF LIPASE: CPT | Performed by: EMERGENCY MEDICINE

## 2017-01-01 PROCEDURE — 80053 COMPREHEN METABOLIC PANEL: CPT | Performed by: INTERNAL MEDICINE

## 2017-01-01 PROCEDURE — 80048 BASIC METABOLIC PNL TOTAL CA: CPT | Performed by: INTERNAL MEDICINE

## 2017-01-01 PROCEDURE — 80053 COMPREHEN METABOLIC PANEL: CPT | Performed by: EMERGENCY MEDICINE

## 2017-01-01 PROCEDURE — 25010000002 MORPHINE SULFATE (PF) 2 MG/ML SOLUTION: Performed by: EMERGENCY MEDICINE

## 2017-01-01 PROCEDURE — 81001 URINALYSIS AUTO W/SCOPE: CPT | Performed by: FAMILY MEDICINE

## 2017-01-01 PROCEDURE — 85027 COMPLETE CBC AUTOMATED: CPT | Performed by: INTERNAL MEDICINE

## 2017-01-01 PROCEDURE — G8979 MOBILITY GOAL STATUS: HCPCS

## 2017-01-01 PROCEDURE — 25010000002 AZITHROMYCIN: Performed by: INTERNAL MEDICINE

## 2017-01-01 PROCEDURE — 87186 SC STD MICRODIL/AGAR DIL: CPT | Performed by: FAMILY MEDICINE

## 2017-01-01 PROCEDURE — 96360 HYDRATION IV INFUSION INIT: CPT

## 2017-01-01 PROCEDURE — 71275 CT ANGIOGRAPHY CHEST: CPT

## 2017-01-01 PROCEDURE — 84439 ASSAY OF FREE THYROXINE: CPT | Performed by: EMERGENCY MEDICINE

## 2017-01-01 PROCEDURE — 25010000002 ONDANSETRON PER 1 MG: Performed by: INTERNAL MEDICINE

## 2017-01-01 PROCEDURE — 84484 ASSAY OF TROPONIN QUANT: CPT | Performed by: EMERGENCY MEDICINE

## 2017-01-01 PROCEDURE — 25010000002 ONDANSETRON PER 1 MG: Performed by: EMERGENCY MEDICINE

## 2017-01-01 PROCEDURE — P9612 CATHETERIZE FOR URINE SPEC: HCPCS

## 2017-01-01 PROCEDURE — 93306 TTE W/DOPPLER COMPLETE: CPT

## 2017-01-01 PROCEDURE — 85651 RBC SED RATE NONAUTOMATED: CPT | Performed by: EMERGENCY MEDICINE

## 2017-01-01 PROCEDURE — 0 IOPAMIDOL PER 1 ML: Performed by: EMERGENCY MEDICINE

## 2017-01-01 PROCEDURE — 93971 EXTREMITY STUDY: CPT

## 2017-01-01 PROCEDURE — 96372 THER/PROPH/DIAG INJ SC/IM: CPT

## 2017-01-01 PROCEDURE — 87086 URINE CULTURE/COLONY COUNT: CPT | Performed by: EMERGENCY MEDICINE

## 2017-01-01 PROCEDURE — 25010000002 MAGNESIUM SULFATE IN D5W 1G/100ML (PREMIX) 1-5 GM/100ML-% SOLUTION: Performed by: INTERNAL MEDICINE

## 2017-01-01 RX ORDER — AMITRIPTYLINE HYDROCHLORIDE 10 MG/1
10 TABLET, FILM COATED ORAL NIGHTLY
COMMUNITY

## 2017-01-01 RX ORDER — ONDANSETRON 2 MG/ML
4 INJECTION INTRAMUSCULAR; INTRAVENOUS EVERY 6 HOURS PRN
Status: DISCONTINUED | OUTPATIENT
Start: 2017-01-01 | End: 2017-01-01 | Stop reason: HOSPADM

## 2017-01-01 RX ORDER — LEVOFLOXACIN 5 MG/ML
500 INJECTION, SOLUTION INTRAVENOUS EVERY 24 HOURS
Status: DISCONTINUED | OUTPATIENT
Start: 2017-01-01 | End: 2017-01-01

## 2017-01-01 RX ORDER — WARFARIN SODIUM 2 MG/1
2 TABLET ORAL
Qty: 7 TABLET | Refills: 0 | Status: SHIPPED | OUTPATIENT
Start: 2017-01-01

## 2017-01-01 RX ORDER — PREDNISONE 1 MG/1
5 TABLET ORAL DAILY
Status: DISCONTINUED | OUTPATIENT
Start: 2017-01-01 | End: 2017-01-01 | Stop reason: HOSPADM

## 2017-01-01 RX ORDER — MEGESTROL ACETATE 40 MG/ML
400 SUSPENSION ORAL 2 TIMES DAILY
Status: DISCONTINUED | OUTPATIENT
Start: 2017-01-01 | End: 2017-01-01 | Stop reason: HOSPADM

## 2017-01-01 RX ORDER — SODIUM CHLORIDE 0.9 % (FLUSH) 0.9 %
10 SYRINGE (ML) INJECTION AS NEEDED
Status: DISCONTINUED | OUTPATIENT
Start: 2017-01-01 | End: 2017-01-01 | Stop reason: HOSPADM

## 2017-01-01 RX ORDER — ONDANSETRON 4 MG/1
4 TABLET, FILM COATED ORAL EVERY 8 HOURS PRN
Qty: 12 TABLET | Refills: 0 | Status: SHIPPED | OUTPATIENT
Start: 2017-01-01

## 2017-01-01 RX ORDER — ASPIRIN 81 MG/1
81 TABLET, CHEWABLE ORAL DAILY
Status: DISCONTINUED | OUTPATIENT
Start: 2017-01-01 | End: 2017-01-01 | Stop reason: HOSPADM

## 2017-01-01 RX ORDER — IPRATROPIUM BROMIDE AND ALBUTEROL SULFATE 2.5; .5 MG/3ML; MG/3ML
3 SOLUTION RESPIRATORY (INHALATION) ONCE
Status: COMPLETED | OUTPATIENT
Start: 2017-01-01 | End: 2017-01-01

## 2017-01-01 RX ORDER — FERROUS SULFATE TAB EC 324 MG (65 MG FE EQUIVALENT) 324 (65 FE) MG
324 TABLET DELAYED RESPONSE ORAL
Status: DISCONTINUED | OUTPATIENT
Start: 2017-01-01 | End: 2017-01-01 | Stop reason: HOSPADM

## 2017-01-01 RX ORDER — MAGNESIUM SULFATE 1 G/100ML
1 INJECTION INTRAVENOUS ONCE
Status: COMPLETED | OUTPATIENT
Start: 2017-01-01 | End: 2017-01-01

## 2017-01-01 RX ORDER — HYDROCODONE BITARTRATE AND ACETAMINOPHEN 10; 325 MG/1; MG/1
1 TABLET ORAL EVERY 6 HOURS PRN
Status: ON HOLD | COMMUNITY
End: 2017-01-01

## 2017-01-01 RX ORDER — AMITRIPTYLINE HYDROCHLORIDE 10 MG/1
10 TABLET, FILM COATED ORAL NIGHTLY
Status: DISCONTINUED | OUTPATIENT
Start: 2017-01-01 | End: 2017-01-01 | Stop reason: HOSPADM

## 2017-01-01 RX ORDER — SODIUM CHLORIDE 0.9 % (FLUSH) 0.9 %
1-10 SYRINGE (ML) INJECTION AS NEEDED
Status: DISCONTINUED | OUTPATIENT
Start: 2017-01-01 | End: 2017-01-01 | Stop reason: HOSPADM

## 2017-01-01 RX ORDER — WARFARIN SODIUM 2 MG/1
2 TABLET ORAL
Status: DISCONTINUED | OUTPATIENT
Start: 2017-01-01 | End: 2017-01-01 | Stop reason: HOSPADM

## 2017-01-01 RX ORDER — HYDROCODONE BITARTRATE AND ACETAMINOPHEN 10; 325 MG/1; MG/1
1 TABLET ORAL EVERY 6 HOURS PRN
Qty: 20 TABLET | Refills: 0 | Status: SHIPPED | OUTPATIENT
Start: 2017-01-01

## 2017-01-01 RX ORDER — WARFARIN SODIUM 2.5 MG/1
2.5 TABLET ORAL
Status: DISCONTINUED | OUTPATIENT
Start: 2017-01-01 | End: 2017-01-01

## 2017-01-01 RX ORDER — MEGESTROL ACETATE 40 MG/1
40 TABLET ORAL DAILY
COMMUNITY

## 2017-01-01 RX ORDER — ONDANSETRON 2 MG/ML
4 INJECTION INTRAMUSCULAR; INTRAVENOUS ONCE
Status: COMPLETED | OUTPATIENT
Start: 2017-01-01 | End: 2017-01-01

## 2017-01-01 RX ORDER — SODIUM CHLORIDE 9 MG/ML
50 INJECTION, SOLUTION INTRAVENOUS CONTINUOUS
Status: DISCONTINUED | OUTPATIENT
Start: 2017-01-01 | End: 2017-01-01 | Stop reason: HOSPADM

## 2017-01-01 RX ORDER — SENNOSIDES 8.6 MG
1 TABLET ORAL NIGHTLY
Status: DISCONTINUED | OUTPATIENT
Start: 2017-01-01 | End: 2017-01-01 | Stop reason: HOSPADM

## 2017-01-01 RX ORDER — MORPHINE SULFATE 2 MG/ML
2 INJECTION, SOLUTION INTRAMUSCULAR; INTRAVENOUS EVERY 4 HOURS PRN
Status: DISCONTINUED | OUTPATIENT
Start: 2017-01-01 | End: 2017-01-01 | Stop reason: HOSPADM

## 2017-01-01 RX ORDER — PANTOPRAZOLE SODIUM 40 MG/1
40 TABLET, DELAYED RELEASE ORAL DAILY
Status: DISCONTINUED | OUTPATIENT
Start: 2017-01-01 | End: 2017-01-01 | Stop reason: HOSPADM

## 2017-01-01 RX ORDER — IPRATROPIUM BROMIDE AND ALBUTEROL SULFATE 2.5; .5 MG/3ML; MG/3ML
3 SOLUTION RESPIRATORY (INHALATION)
Status: DISCONTINUED | OUTPATIENT
Start: 2017-01-01 | End: 2017-01-01 | Stop reason: HOSPADM

## 2017-01-01 RX ORDER — WARFARIN SODIUM 5 MG/1
5 TABLET ORAL
Status: DISCONTINUED | OUTPATIENT
Start: 2017-01-01 | End: 2017-01-01

## 2017-01-01 RX ORDER — WARFARIN SODIUM 2.5 MG/1
2.5 TABLET ORAL
Status: COMPLETED | OUTPATIENT
Start: 2017-01-01 | End: 2017-01-01

## 2017-01-01 RX ORDER — FERROUS SULFATE 325(65) MG
325 TABLET ORAL
COMMUNITY

## 2017-01-01 RX ORDER — SODIUM CHLORIDE 9 MG/ML
75 INJECTION, SOLUTION INTRAVENOUS CONTINUOUS
Status: DISCONTINUED | OUTPATIENT
Start: 2017-01-01 | End: 2017-01-01 | Stop reason: HOSPADM

## 2017-01-01 RX ORDER — MORPHINE SULFATE 2 MG/ML
2 INJECTION, SOLUTION INTRAMUSCULAR; INTRAVENOUS ONCE
Status: COMPLETED | OUTPATIENT
Start: 2017-01-01 | End: 2017-01-01

## 2017-01-01 RX ORDER — LISINOPRIL AND HYDROCHLOROTHIAZIDE 25; 20 MG/1; MG/1
1 TABLET ORAL DAILY
COMMUNITY

## 2017-01-01 RX ORDER — HYDRALAZINE HYDROCHLORIDE 20 MG/ML
10 INJECTION INTRAMUSCULAR; INTRAVENOUS EVERY 6 HOURS PRN
Status: DISCONTINUED | OUTPATIENT
Start: 2017-01-01 | End: 2017-01-01 | Stop reason: HOSPADM

## 2017-01-01 RX ORDER — HYDROCODONE BITARTRATE AND ACETAMINOPHEN 10; 325 MG/1; MG/1
1 TABLET ORAL EVERY 6 HOURS PRN
Status: DISCONTINUED | OUTPATIENT
Start: 2017-01-01 | End: 2017-01-01 | Stop reason: HOSPADM

## 2017-01-01 RX ADMIN — IPRATROPIUM BROMIDE AND ALBUTEROL SULFATE 3 ML: 2.5; .5 SOLUTION RESPIRATORY (INHALATION) at 06:39

## 2017-01-01 RX ADMIN — MORPHINE SULFATE 2 MG: 2 INJECTION, SOLUTION INTRAMUSCULAR; INTRAVENOUS at 07:25

## 2017-01-01 RX ADMIN — FERROUS SULFATE TAB EC 324 MG (65 MG FE EQUIVALENT) 324 MG: 324 (65 FE) TABLET DELAYED RESPONSE at 08:04

## 2017-01-01 RX ADMIN — ASPIRIN 81 MG 81 MG: 81 TABLET ORAL at 08:02

## 2017-01-01 RX ADMIN — ENOXAPARIN SODIUM 50 MG: 60 INJECTION SUBCUTANEOUS at 17:41

## 2017-01-01 RX ADMIN — IPRATROPIUM BROMIDE AND ALBUTEROL SULFATE 3 ML: 2.5; .5 SOLUTION RESPIRATORY (INHALATION) at 07:03

## 2017-01-01 RX ADMIN — IPRATROPIUM BROMIDE AND ALBUTEROL SULFATE 3 ML: 2.5; .5 SOLUTION RESPIRATORY (INHALATION) at 06:56

## 2017-01-01 RX ADMIN — SODIUM CHLORIDE 50 ML/HR: 900 INJECTION, SOLUTION INTRAVENOUS at 17:56

## 2017-01-01 RX ADMIN — HYDROCODONE BITARTRATE AND ACETAMINOPHEN 1 TABLET: 10; 325 TABLET ORAL at 17:51

## 2017-01-01 RX ADMIN — PREDNISONE 5 MG: 5 TABLET ORAL at 08:04

## 2017-01-01 RX ADMIN — SENNOSIDES 8.6 MG: 8.6 TABLET, FILM COATED ORAL at 22:07

## 2017-01-01 RX ADMIN — HYDROCODONE BITARTRATE AND ACETAMINOPHEN 1 TABLET: 10; 325 TABLET ORAL at 02:28

## 2017-01-01 RX ADMIN — MORPHINE SULFATE 2 MG: 2 INJECTION, SOLUTION INTRAMUSCULAR; INTRAVENOUS at 16:57

## 2017-01-01 RX ADMIN — SENNOSIDES 8.6 MG: 8.6 TABLET, FILM COATED ORAL at 20:15

## 2017-01-01 RX ADMIN — WARFARIN SODIUM 5 MG: 5 TABLET ORAL at 18:48

## 2017-01-01 RX ADMIN — HYDROCODONE BITARTRATE AND ACETAMINOPHEN 1 TABLET: 10; 325 TABLET ORAL at 03:45

## 2017-01-01 RX ADMIN — AMITRIPTYLINE HYDROCHLORIDE 10 MG: 10 TABLET, FILM COATED ORAL at 20:11

## 2017-01-01 RX ADMIN — IOPAMIDOL 56 ML: 755 INJECTION, SOLUTION INTRAVENOUS at 17:58

## 2017-01-01 RX ADMIN — ONDANSETRON 4 MG: 2 INJECTION INTRAMUSCULAR; INTRAVENOUS at 16:57

## 2017-01-01 RX ADMIN — LEVOFLOXACIN 500 MG: 5 INJECTION, SOLUTION INTRAVENOUS at 22:59

## 2017-01-01 RX ADMIN — WARFARIN SODIUM 2.5 MG: 2.5 TABLET ORAL at 22:07

## 2017-01-01 RX ADMIN — FERROUS SULFATE TAB EC 324 MG (65 MG FE EQUIVALENT) 324 MG: 324 (65 FE) TABLET DELAYED RESPONSE at 08:03

## 2017-01-01 RX ADMIN — IPRATROPIUM BROMIDE AND ALBUTEROL SULFATE 3 ML: 2.5; .5 SOLUTION RESPIRATORY (INHALATION) at 08:45

## 2017-01-01 RX ADMIN — ASPIRIN 81 MG 81 MG: 81 TABLET ORAL at 08:10

## 2017-01-01 RX ADMIN — IPRATROPIUM BROMIDE AND ALBUTEROL SULFATE 3 ML: 2.5; .5 SOLUTION RESPIRATORY (INHALATION) at 10:16

## 2017-01-01 RX ADMIN — ONDANSETRON 4 MG: 2 INJECTION INTRAMUSCULAR; INTRAVENOUS at 15:36

## 2017-01-01 RX ADMIN — IPRATROPIUM BROMIDE AND ALBUTEROL SULFATE 3 ML: 2.5; .5 SOLUTION RESPIRATORY (INHALATION) at 14:28

## 2017-01-01 RX ADMIN — ENOXAPARIN SODIUM 50 MG: 60 INJECTION SUBCUTANEOUS at 17:43

## 2017-01-01 RX ADMIN — LEVOFLOXACIN 500 MG: 5 INJECTION, SOLUTION INTRAVENOUS at 23:00

## 2017-01-01 RX ADMIN — IPRATROPIUM BROMIDE AND ALBUTEROL SULFATE 3 ML: 2.5; .5 SOLUTION RESPIRATORY (INHALATION) at 10:57

## 2017-01-01 RX ADMIN — IPRATROPIUM BROMIDE AND ALBUTEROL SULFATE 3 ML: 2.5; .5 SOLUTION RESPIRATORY (INHALATION) at 15:12

## 2017-01-01 RX ADMIN — PANTOPRAZOLE SODIUM 40 MG: 40 TABLET, DELAYED RELEASE ORAL at 08:04

## 2017-01-01 RX ADMIN — FERROUS SULFATE TAB EC 324 MG (65 MG FE EQUIVALENT) 324 MG: 324 (65 FE) TABLET DELAYED RESPONSE at 08:10

## 2017-01-01 RX ADMIN — SODIUM CHLORIDE 150 ML: 900 INJECTION, SOLUTION INTRAVENOUS at 22:45

## 2017-01-01 RX ADMIN — IPRATROPIUM BROMIDE AND ALBUTEROL SULFATE 3 ML: 2.5; .5 SOLUTION RESPIRATORY (INHALATION) at 14:39

## 2017-01-01 RX ADMIN — IPRATROPIUM BROMIDE AND ALBUTEROL SULFATE 3 ML: 2.5; .5 SOLUTION RESPIRATORY (INHALATION) at 19:43

## 2017-01-01 RX ADMIN — PANTOPRAZOLE SODIUM 40 MG: 40 TABLET, DELAYED RELEASE ORAL at 08:02

## 2017-01-01 RX ADMIN — ENOXAPARIN SODIUM 50 MG: 60 INJECTION SUBCUTANEOUS at 06:15

## 2017-01-01 RX ADMIN — AMITRIPTYLINE HYDROCHLORIDE 10 MG: 10 TABLET, FILM COATED ORAL at 20:15

## 2017-01-01 RX ADMIN — AMITRIPTYLINE HYDROCHLORIDE 10 MG: 10 TABLET, FILM COATED ORAL at 22:23

## 2017-01-01 RX ADMIN — PANTOPRAZOLE SODIUM 40 MG: 40 TABLET, DELAYED RELEASE ORAL at 08:10

## 2017-01-01 RX ADMIN — SENNOSIDES 8.6 MG: 8.6 TABLET, FILM COATED ORAL at 22:23

## 2017-01-01 RX ADMIN — IPRATROPIUM BROMIDE AND ALBUTEROL SULFATE 3 ML: 2.5; .5 SOLUTION RESPIRATORY (INHALATION) at 20:22

## 2017-01-01 RX ADMIN — MAGNESIUM SULFATE HEPTAHYDRATE 1 G: 1 INJECTION, SOLUTION INTRAVENOUS at 12:03

## 2017-01-01 RX ADMIN — PANTOPRAZOLE SODIUM 40 MG: 40 TABLET, DELAYED RELEASE ORAL at 08:03

## 2017-01-01 RX ADMIN — ENOXAPARIN SODIUM 50 MG: 60 INJECTION SUBCUTANEOUS at 06:22

## 2017-01-01 RX ADMIN — ASPIRIN 81 MG 81 MG: 81 TABLET ORAL at 08:03

## 2017-01-01 RX ADMIN — HYDROCODONE BITARTRATE AND ACETAMINOPHEN 1 TABLET: 10; 325 TABLET ORAL at 10:58

## 2017-01-01 RX ADMIN — SENNOSIDES 8.6 MG: 8.6 TABLET, FILM COATED ORAL at 20:11

## 2017-01-01 RX ADMIN — AMITRIPTYLINE HYDROCHLORIDE 10 MG: 10 TABLET, FILM COATED ORAL at 22:07

## 2017-01-01 RX ADMIN — ASPIRIN 81 MG 81 MG: 81 TABLET ORAL at 08:04

## 2017-01-01 RX ADMIN — HYDROCODONE BITARTRATE AND ACETAMINOPHEN 1 TABLET: 10; 325 TABLET ORAL at 06:23

## 2017-01-01 RX ADMIN — SODIUM CHLORIDE 50 ML/HR: 900 INJECTION, SOLUTION INTRAVENOUS at 17:31

## 2017-01-01 RX ADMIN — PREDNISONE 5 MG: 5 TABLET ORAL at 08:03

## 2017-01-01 RX ADMIN — PREDNISONE 5 MG: 5 TABLET ORAL at 08:02

## 2017-01-01 RX ADMIN — HYDROCODONE BITARTRATE AND ACETAMINOPHEN 1 TABLET: 10; 325 TABLET ORAL at 17:27

## 2017-01-01 RX ADMIN — MEGESTROL ACETATE 400 MG: 40 SUSPENSION ORAL at 17:41

## 2017-01-01 RX ADMIN — FERROUS SULFATE TAB EC 324 MG (65 MG FE EQUIVALENT) 324 MG: 324 (65 FE) TABLET DELAYED RESPONSE at 08:02

## 2017-01-01 RX ADMIN — HYDROCODONE BITARTRATE AND ACETAMINOPHEN 1 TABLET: 10; 325 TABLET ORAL at 05:55

## 2017-01-01 RX ADMIN — SODIUM CHLORIDE 50 ML/HR: 900 INJECTION, SOLUTION INTRAVENOUS at 20:10

## 2017-01-01 RX ADMIN — HYDROCODONE BITARTRATE AND ACETAMINOPHEN 1 TABLET: 10; 325 TABLET ORAL at 23:18

## 2017-01-01 RX ADMIN — IPRATROPIUM BROMIDE AND ALBUTEROL SULFATE 3 ML: 2.5; .5 SOLUTION RESPIRATORY (INHALATION) at 16:48

## 2017-01-01 RX ADMIN — WARFARIN SODIUM 2 MG: 2 TABLET ORAL at 17:41

## 2017-01-01 RX ADMIN — LEVOFLOXACIN 500 MG: 5 INJECTION, SOLUTION INTRAVENOUS at 23:12

## 2017-01-01 RX ADMIN — IPRATROPIUM BROMIDE AND ALBUTEROL SULFATE 3 ML: 2.5; .5 SOLUTION RESPIRATORY (INHALATION) at 19:31

## 2017-01-01 RX ADMIN — AZITHROMYCIN 500 MG: 500 INJECTION, POWDER, LYOPHILIZED, FOR SOLUTION INTRAVENOUS at 22:11

## 2017-01-01 RX ADMIN — ENOXAPARIN SODIUM 50 MG: 60 INJECTION SUBCUTANEOUS at 18:58

## 2017-01-01 RX ADMIN — MORPHINE SULFATE 2 MG: 2 INJECTION, SOLUTION INTRAMUSCULAR; INTRAVENOUS at 11:15

## 2017-01-01 RX ADMIN — ENOXAPARIN SODIUM 50 MG: 60 INJECTION SUBCUTANEOUS at 05:42

## 2017-01-01 RX ADMIN — LEVOFLOXACIN 500 MG: 5 INJECTION, SOLUTION INTRAVENOUS at 22:03

## 2017-01-01 RX ADMIN — SODIUM CHLORIDE 500 ML: 9 INJECTION, SOLUTION INTRAVENOUS at 17:01

## 2017-01-01 RX ADMIN — HYDROCODONE BITARTRATE AND ACETAMINOPHEN 1 TABLET: 10; 325 TABLET ORAL at 16:07

## 2017-01-01 RX ADMIN — PREDNISONE 5 MG: 5 TABLET ORAL at 08:10

## 2017-01-01 RX ADMIN — MEGESTROL ACETATE 400 MG: 40 SUSPENSION ORAL at 11:12

## 2017-01-01 RX ADMIN — MEGESTROL ACETATE 400 MG: 40 SUSPENSION ORAL at 08:10

## 2017-01-01 RX ADMIN — IPRATROPIUM BROMIDE AND ALBUTEROL SULFATE 3 ML: 2.5; .5 SOLUTION RESPIRATORY (INHALATION) at 10:40

## 2017-01-01 RX ADMIN — SODIUM CHLORIDE 100 ML/HR: 900 INJECTION, SOLUTION INTRAVENOUS at 18:55

## 2017-01-01 RX ADMIN — ENOXAPARIN SODIUM 50 MG: 60 INJECTION SUBCUTANEOUS at 08:03

## 2017-01-01 RX ADMIN — WARFARIN SODIUM 5 MG: 5 TABLET ORAL at 17:28

## 2017-01-01 RX ADMIN — ENOXAPARIN SODIUM 50 MG: 60 INJECTION SUBCUTANEOUS at 18:48

## 2017-01-01 RX ADMIN — MAGNESIUM HYDROXIDE 10 ML: 2400 SUSPENSION ORAL at 11:51

## 2017-01-01 RX ADMIN — SODIUM CHLORIDE 50 ML/HR: 900 INJECTION, SOLUTION INTRAVENOUS at 08:04

## 2017-04-16 PROBLEM — R53.1 WEAKNESS: Status: ACTIVE | Noted: 2017-01-01

## 2017-04-17 NOTE — DISCHARGE INSTRUCTIONS
Clear liquid diet x24 hours  Return ED fever, chest pain, abdominal pain, vomiting, bleeding, dehydration, shortness of air, worse condition, other concerns

## 2017-04-17 NOTE — ED PROVIDER NOTES
Subjective   HPI Comments: 89yo female pmh significant RA/PMR/htn/gerd/cva presents ED c/o 3d hx decreased po intake/generalized fatigue/malaise/nausea.  ROS neg fever/chills/chest pain/soa/abd pain/dysuria.    Patient is a 89 y.o. female presenting with weakness.   Weakness - Generalized   Severity:  Moderate  Onset quality:  Gradual  Duration:  3 days  Timing:  Constant  Progression:  Worsening  Relieved by:  Nothing  Worsened by:  Nothing  Ineffective treatments:  None tried  Associated symptoms: chest pain and nausea    Associated symptoms: no abdominal pain, no dysuria, no fever, no foul-smelling urine, no headaches, no melena, no shortness of breath and no vomiting        Review of Systems   Constitutional: Negative for chills and fever.   Respiratory: Negative for shortness of breath.    Cardiovascular: Positive for chest pain.   Gastrointestinal: Positive for nausea. Negative for abdominal pain, melena and vomiting.   Genitourinary: Negative for dysuria.   Neurological: Negative for headaches.       Past Medical History:   Diagnosis Date   • Artificial lens present     and in position   • Astigmatism    • Blepharitis     quiet   • Myopia    • Nonexudative age-related macular degeneration     mild   • Pain in eye        Allergies   Allergen Reactions   • Ciprofloxacin    • Sulfa Antibiotics Nausea Only   • Lincomycin Rash   • Other Rash     Ultra Cee   • Penicillins Rash       History reviewed. No pertinent surgical history.    History reviewed. No pertinent family history.    Social History     Social History   • Marital status:      Spouse name: N/A   • Number of children: N/A   • Years of education: N/A     Social History Main Topics   • Smoking status: Never Smoker   • Smokeless tobacco: None   • Alcohol use No   • Drug use: No   • Sexual activity: Defer     Other Topics Concern   • None     Social History Narrative   • None           Objective   Physical Exam   Constitutional: She is oriented to  person, place, and time. She appears well-developed and well-nourished.   HENT:   Head: Normocephalic and atraumatic.   Mouth/Throat: Mucous membranes are dry.   Eyes: Pupils are equal, round, and reactive to light.   Neck: Neck supple. No JVD present.   Cardiovascular: Normal rate, regular rhythm, normal heart sounds and intact distal pulses.  Exam reveals no friction rub.    No murmur heard.  Pulmonary/Chest: Effort normal and breath sounds normal. She has no wheezes. She has no rales.   Abdominal: Soft. Bowel sounds are normal. There is no tenderness. There is no rebound and no guarding.   Lymphadenopathy:     She has no cervical adenopathy.   Neurological: She is alert and oriented to person, place, and time.   Skin: Skin is warm and dry.   Nursing note and vitals reviewed.      ECG 12 Lead    Date/Time: 4/16/2017 10:38 PM  Performed by: MATTHEW GOMEZ  Authorized by: MATTHEW GOMEZ   Interpreted by physician  Rhythm: sinus rhythm  Rate: normal  BPM: 85  QRS axis: normal  Conduction: conduction normal  ST Segments: ST segments normal  T Waves: T waves normal  Other: no other findings  Clinical impression: normal ECG               ED Course  ED Course   Comment By Time   D/w Dr. Matt, Hospitalist, who will consult on patient in ED. Matthew Gomez MD 04/16 3894   Pt evaluated in ED by Dr. Matt, hospitalist, et recommended for discharge home with office followup Matthew Gomez MD 04/16 2300      Labs Reviewed   COMPREHENSIVE METABOLIC PANEL - Abnormal; Notable for the following:        Result Value    Glucose 133 (*)     Sodium 135 (*)     Total Protein 5.9 (*)     AST (SGOT) 37 (*)     Globulin 2.2 (*)     All other components within normal limits    Narrative:     The MDRD GFR formula is only valid for adults with stable renal function between ages 18 and 70.   CBC WITH AUTO DIFFERENTIAL - Abnormal; Notable for the following:     RBC 3.57 (*)     Hemoglobin 11.3 (*)     Hematocrit 33.5 (*)     RDW-SD 46.5 (*)      MPV 7.8 (*)     All other components within normal limits   LIPASE - Normal   URINALYSIS W/ CULTURE IF INDICATED - Normal    Narrative:     Urine microscopic not indicated.   LACTIC ACID, PLASMA - Normal   APTT - Normal    Narrative:     The recommended Heparin therapeutic range is 68-97 seconds.   PROTIME-INR - Normal    Narrative:     Therapeutic range for most indications is 2.0-3.0 INR,  or 2.5-3.5 for mechanical heart valves.   TROPONIN (IN-HOUSE) - Normal   CK - Normal   CK MB - Normal   TSH - Normal   T4, FREE - Normal   RAINBOW DRAW    Narrative:     The following orders were created for panel order [place] Draw.  Procedure                               Abnormality         Status                     ---------                               -----------         ------                     Light Blue Top[[id number]]                                    Final result               Green Top (Gel)[[id number]]                                   Final result               Lavender Top[[id number]]                                      Final result               Gold Top - SST[[id number]]                                    Final result                 Please view results for these tests on the individual orders.   TROPONIN (IN-HOUSE)   SEDIMENTATION RATE   CBC AND DIFFERENTIAL    Narrative:     The following orders were created for panel order CBC & Differential.  Procedure                               Abnormality         Status                     ---------                               -----------         ------                     CBC Auto Differential[[id number]]         Abnormal            Final result                 Please view results for these tests on the individual orders.   LIGHT BLUE TOP   GREEN TOP   LAVENDER TOP   GOLD TOP - SST     Xr Chest 1 View    Result Date: 4/16/2017  Narrative: Chest single view on  4/16/2017 CLINICAL INDICATION: Weakness COMPARISON: 12/7/2016 FINDINGS: There is slight elevation of the  right hemidiaphragm. There are bibasilar opacities consistent with likely atelectasis and/or scarring. There is evidence of calcified granulomatous disease in the chest. The patient is status post vertebroplasty of a mid to lower thoracic vertebral body. Lungs are otherwise clear. Heart is within normal limits for size. Vascular calcification is noted in the aorta.     Impression: Mild bibasilar opacities likely representing atelectasis or scarring. Electronically signed by:  Reg Aguirre  4/16/2017 9:37 PM CDT Workstation: [id number]                [facility]    Final diagnoses:   Weakness   Dehydration            Emmanuel Lafleur MD  04/16/17 1634       Emmanuel Lafleur MD  04/16/17 7357

## 2017-07-01 PROBLEM — I26.99 PULMONARY EMBOLISM WITHOUT ACUTE COR PULMONALE (HCC): Status: ACTIVE | Noted: 2017-01-01

## 2017-07-01 NOTE — ED TRIAGE NOTES
Patient presents to the ED with complaints of weakness. Patient has had a few falls here recently. She does have a history of strokes in the past.

## 2017-07-01 NOTE — ED PROVIDER NOTES
Subjective   HPI Comments: 89 years old female with multiple medical problems spur in the ER with increasing fatigue, cough and shortness of breath for almost 1 week.  She also has left leg swelling which is there for 2-3 months.  She has no fever or chills.  She is feeling weak and tired all over with no energy to do her routine activities.    Patient is a 89 y.o. female presenting with fatigue.   History provided by:  Patient and relative  Fatigue   Severity:  Moderate  Onset quality:  Gradual  Duration:  1 week  Timing:  Constant  Progression:  Worsening  Chronicity:  New  Associated symptoms: abdominal pain, cough, fatigue and shortness of breath    Associated symptoms: no chest pain, no congestion, no diarrhea, no ear pain, no fever, no headaches, no loss of consciousness, no nausea, no rash, no rhinorrhea, no sore throat, no vomiting and no wheezing    Abdominal pain:     Location:  Generalized    Quality: aching and dull      Severity:  Mild    Onset quality:  Gradual    Timing:  Intermittent    Progression:  Waxing and waning    Chronicity:  Recurrent  Cough:     Cough characteristics:  Dry    Severity:  Moderate    Onset quality:  Gradual    Timing:  Intermittent    Progression:  Waxing and waning    Chronicity:  New  Fatigue:     Severity:  Moderate    Duration:  1 week    Timing:  Constant    Progression:  Worsening  Shortness of breath:     Severity:  Moderate    Onset quality:  Gradual    Timing:  Intermittent    Progression:  Waxing and waning      Review of Systems   Constitutional: Positive for fatigue. Negative for chills and fever.   HENT: Negative for congestion, ear pain, facial swelling, rhinorrhea, sinus pressure and sore throat.    Eyes: Negative for photophobia and visual disturbance.   Respiratory: Positive for cough and shortness of breath. Negative for chest tightness and wheezing.    Cardiovascular: Negative for chest pain.   Gastrointestinal: Positive for abdominal pain. Negative for  abdominal distention, diarrhea, nausea and vomiting.   Endocrine: Negative for polyuria.   Genitourinary: Negative for flank pain.   Musculoskeletal: Positive for back pain and gait problem. Negative for joint swelling and neck pain.   Skin: Negative for rash.   Neurological: Negative for seizures, loss of consciousness, numbness and headaches.   Hematological: Negative for adenopathy.   Psychiatric/Behavioral: Negative for agitation.       Past Medical History:   Diagnosis Date   • Artificial lens present     and in position   • Astigmatism    • Blepharitis     quiet   • Myopia    • Nonexudative age-related macular degeneration     mild   • Pain in eye    • Stroke        Allergies   Allergen Reactions   • Ciprofloxacin    • Sulfa Antibiotics Nausea Only   • Lincomycin Rash   • Other Rash     Ultra Cee   • Penicillins Rash       History reviewed. No pertinent surgical history.    History reviewed. No pertinent family history.    Social History     Social History   • Marital status:      Spouse name: N/A   • Number of children: N/A   • Years of education: N/A     Social History Main Topics   • Smoking status: Never Smoker   • Smokeless tobacco: None   • Alcohol use No   • Drug use: No   • Sexual activity: Defer     Other Topics Concern   • None     Social History Narrative           Objective   Physical Exam   Constitutional: She is oriented to person, place, and time. She appears well-developed and well-nourished. No distress.   HENT:   Head: Normocephalic and atraumatic.   Eyes: Conjunctivae and EOM are normal.   Neck: Normal range of motion. Neck supple.   Cardiovascular: Regular rhythm and normal heart sounds.  Tachycardia present.    Pulmonary/Chest: Effort normal and breath sounds normal. No respiratory distress. She has no wheezes.   Abdominal: Soft. Bowel sounds are normal. There is no tenderness (mild generalized). There is no guarding.   Musculoskeletal: Normal range of motion. She exhibits edema  (swelling left leg as compared to right one, with positive gucci sign ). She exhibits no deformity.   Neurological: She is alert and oriented to person, place, and time. She exhibits normal muscle tone.   Skin: Skin is warm and dry. No rash noted.   Psychiatric: She has a normal mood and affect.   Nursing note and vitals reviewed.      ECG 12 Lead    Date/Time: 7/1/2017 4:00 PM  Performed by: ADAIR ORTEGA  Authorized by: ADAIR ORTEGA   Interpreted by physician  Rhythm: sinus rhythm  Rate: tachycardic  BPM: 113  QRS axis: normal  T Waves: T waves normal  Clinical impression: abnormal ECG               ED Course  ED Course   Comment By Time   She is given breathing treatment and broad workup is done.  She is also given fluid as patient was tachycardic.  EKG did not show any acute ischemic changes.  Because of elevated d-dimer patient underwent CTA and is positive for pulmonary embolism.  I have D/w , will start on lovenox. Adair Ortega MD 07/01 7364          Labs Reviewed   COMPREHENSIVE METABOLIC PANEL - Abnormal; Notable for the following:        Result Value    Glucose 115 (*)     CO2 21.0 (*)     Total Protein 5.6 (*)     Albumin 3.10 (*)     All other components within normal limits    Narrative:     The MDRD GFR formula is only valid for adults with stable renal function between ages 18 and 70.   URINALYSIS W/ CULTURE IF INDICATED - Abnormal; Notable for the following:     Blood, UA Trace (*)     Leuk Esterase, UA Small (1+) (*)     All other components within normal limits   LACTIC ACID, PLASMA - Abnormal; Notable for the following:     Lactate 2.2 (*)     All other components within normal limits   CBC WITH AUTO DIFFERENTIAL - Abnormal; Notable for the following:     MCV 98.2 (*)     RDW-SD 49.3 (*)     Neutrophil % 88.8 (*)     Lymphocyte % 5.9 (*)     Lymphocytes, Absolute 0.50 (*)     Immature Grans, Absolute 0.04 (*)     All other components within normal limits   D-DIMER, QUANTITATIVE -  Abnormal; Notable for the following:     D-Dimer, Quantitative >4000 (*)     All other components within normal limits    Narrative:     Dimer values <500 ng/ml FEU are FDA approved as aid in diagnosis of deep venous thrombosis and pulmonary embolism.  This test should not be used in an exclusion strategy with pretest probability alone.    A recent guideline regarding diagnosis for pulmonary thomboembolism recommends an adjusted exclusion criterion of age x 10 ng/ml FEU for patients >50 years of age ([employer] Intern Med 2015; 163: 701-711).   URINALYSIS, MICROSCOPIC ONLY - Abnormal; Notable for the following:     RBC, UA 0-2 (*)     WBC, UA Too Numerous to Count (*)     Bacteria, UA 4+ (*)     Squamous Epithelial Cells, UA 6-12 (*)     All other components within normal limits   PROTIME-INR - Normal    Narrative:     Therapeutic range for most indications is 2.0-3.0 INR,  or 2.5-3.5 for mechanical heart valves.   CK - Normal   CK MB - Normal   TROPONIN (IN-HOUSE) - Normal   TSH - Normal   URINE CULTURE   RAINBOW DRAW    Narrative:     The following orders were created for panel order [place] Draw.  Procedure                               Abnormality         Status                     ---------                               -----------         ------                     Light Blue Top[[id number]]                                   Final result               Green Top (Gel)[[id number]]                                  Final result               Lavender Top[[id number]]                                     Final result               Gold Top - SST[[id number]]                                   Final result                 Please view results for these tests on the individual orders.   SCAN SLIDE   LACTATE ACID, REFLEX   CBC AND DIFFERENTIAL    Narrative:     The following orders were created for panel order CBC & Differential.  Procedure                               Abnormality         Status                     ---------                                -----------         ------                     Scan Slide[[id number]]                                       Final result               CBC Auto Differential[[id number]]        Abnormal            Final result                 Please view results for these tests on the individual orders.   LIGHT BLUE TOP   GREEN TOP   LAVENDER TOP   GOLD TOP - SST       Xr Abdomen 2 View With Chest 1 View    Result Date: 7/1/2017  Narrative: EXAMINATION:  Acute abdominal series            VIEWS:  Chest:  1 ; Abdo:  2 DATE/TIME:     7/1/2017 5:48 PM CDT   INDICATION:  Fatigue, diffuse abdominal pain             COMPARISON EXAMINATION:    none                          FINDINGS:                         Chest:     heart size: within normal limits     mediastinal contour: within normal limits     lungs: no evidence of focal air space disease, grossly negative     pleura:  no pleural fluid     osseous: Diffuse osteopenia, prior vertebroplasty in the thoracic spine.     misc: Abdomen:     bowel gas pattern: nonobstructive     free air:  none visualized     calculi:   none visualized     osseous:  Multilevel vertebral body compression deformities.     misc:  Large amount of stool in the colon                          Impression: CONCLUSION:    1. No gross evidence of an acute abdominal process. 2. Large amount of stool in the colon, consider constipation/impaction..                            If pain or symptoms persist beyond reasonable expectations, a contrast enhanced CT examination is suggested, as is deemed clinical appropriate.               Electronically signed by:  DEVENDRA Garner MD  7/1/2017 5:50 PM CDT Workstation: [id number]    Ct Angiogram Chest With Contrast    Result Date: 7/1/2017  Narrative: EXAM:  Computed Tomography with CTA       REGION:  Chest     INDICATION:    dyspnea   - rule out pulmonary embolism     CLINICAL HISTORY: CORRELATIVE IMAGING:  None                       TECHNIQUE:    - PE /  vascular protocol    - reconstructions:  axial, coronal, sagittal, obliques    - computer-generated 3D reconstructions (MIPS) were performed.    - contrast:  intravenous Isovue 370, 56 mL                             This exam was performed according to the departmental dose-optimization program which includes automated exposure control, adjustment of the mA and/or kV according to patient size and/or use of iterative reconstruction technique.     COMMENTS: - Pulmonary arterial system:     - Main pulmonary artery trunk:  negative     - Left, right main pulmonary arteries: Nonocclusive thrombus in the right main     - Lobar arteries: Nonocclusive thrombus in the right lower lobe , nonocclusive thrombus in the right upper lobe     - Segmental arteries: grossly negative.  - Systemic vascularity (as visualized):       - Aorta:  grossly negative / normal caliber / no dissection       - roots of great vessels:  grossly negative / normal caliber       - SVC / IVC:  grossly negative / normal caliber  - Misc (limited visualization):     - pulmonary parenchyma:  Scattered airspace changes in the right base.     - pleura:  negative     - mediastinal / fred:  negative     - neck, inferior:  grossly wnl     - subdiaphragmatic structures:  grossly negative (limited evaluation)     - osseous:     - misc:    .        Impression: CONCLUSION:      1.  Nonocclusive intraluminal thrombus primarily in the right main pulmonary artery, right lower lobe artery, and right upper lobe artery          2.  No evidence of pathology associated with the visualized aorta.       3. Patchy airspace changes in the right base. Critical result alert: ------------------------ The results of examination have been discussed with the referring healthcare provider, Elaine Wesley, with read back, immediately following interpretation the examination on 7/1/17 at 19:20 hours.                           Electronically signed by:  DEVENDRA Garner MD  7/1/2017  6:21 PM CDT Workstation: [id number]    Final diagnoses:   Other acute pulmonary embolism without acute cor pulmonale   Sepsis secondary to UTI            Aneesh Henry MD  07/07/17 7110

## 2017-07-02 PROBLEM — I82.432 ACUTE DEEP VEIN THROMBOSIS (DVT) OF POPLITEAL VEIN OF LEFT LOWER EXTREMITY (HCC): Status: ACTIVE | Noted: 2017-01-01

## 2017-07-02 PROBLEM — N39.0 UTI (URINARY TRACT INFECTION): Status: ACTIVE | Noted: 2017-01-01

## 2017-07-02 PROBLEM — I26.99 PULMONARY EMBOLISM ON RIGHT (HCC): Status: ACTIVE | Noted: 2017-01-01

## 2017-07-02 PROBLEM — I82.412 ACUTE DEEP VEIN THROMBOSIS (DVT) OF LEFT FEMORAL VEIN (HCC): Status: ACTIVE | Noted: 2017-01-01

## 2017-07-02 PROBLEM — I82.409 DVT (DEEP VENOUS THROMBOSIS) (HCC): Chronic | Status: ACTIVE | Noted: 2017-01-01

## 2017-07-02 NOTE — PLAN OF CARE
Problem: Patient Care Overview (Adult)  Goal: Plan of Care Review  Outcome: Ongoing (interventions implemented as appropriate)    07/02/17 4246   Coping/Psychosocial Response Interventions   Plan Of Care Reviewed With patient   Patient Care Overview   Progress no change         Problem: VTE, DVT and PE (Adult)  Goal: Signs and Symptoms of Listed Potential Problems Will be Absent or Manageable (VTE, DVT and PE)  Outcome: Ongoing (interventions implemented as appropriate)

## 2017-07-02 NOTE — DISCHARGE PLACEMENT REQUEST
"Stephani Bush (89 y.o. Female)     Date of Birth Social Security Number Address Home Phone MRN    07/20/1926  [address] [place] [address]  [place] [address] [phone] [id number]    Orthodox Marital Status          Episcopal        Admission Date Admission Type Admitting Provider Attending Provider Department, Room/Bed    7/1/17 Emergency Echendu, MD Eloina Harding, Rodrigo RAMSAY MD [facility] STEPDOWN UNIT, 319/1    Discharge Date Discharge Disposition Discharge Destination                      Attending Provider: Rodrigo Duvall MD     Allergies:  Ciprofloxacin, Sulfa Antibiotics, Lincomycin, Other, Penicillins    Isolation:  None   Infection:  None   Code Status:  Conditional    Ht:  62\" (157.5 cm)   Wt:  100 lb 6.4 oz (45.5 kg)    Admission Cmt:  None   Principal Problem:  Pulmonary embolism on right [I26.99]                 Active Insurance as of 7/1/2017     Primary Coverage     Payor Plan Insurance Group Employer/Plan Group    MEDICARE MEDICARE A & B      Payor Plan Address Payor Plan Phone Number Effective From Effective To    [address] [phone] [birth year][address]     [place] [address]       Subscriber Name Subscriber Birth Date Member ID       STEPHANI BUSH 7/20/1926 [id number]           Secondary Coverage     Payor Plan Insurance Group Employer/Plan Group    [employer] MED SUPP [employer] C[employer] OPTIONS [id number]     Payor Plan Address Payor Plan Phone Number Effective From Effective To    [facility] [phone] 1/1/2017     [address]       [place] [address]       Subscriber Name Subscriber Birth Date Member ID       STEPHANI BUSH 7/20/1926 [id number]                 Emergency Contacts      (Rel.) Home Phone Work Phone Mobile Phone    Fatuma Acosta (Daughter) [phone]              "

## 2017-07-02 NOTE — PLAN OF CARE
Problem: VTE, DVT and PE (Adult)  Goal: Signs and Symptoms of Listed Potential Problems Will be Absent or Manageable (VTE, DVT and PE)  Outcome: Ongoing (interventions implemented as appropriate)    07/02/17 0624   VTE, DVT and PE   Problems Assessed (VTE, DVT, PE) all   Problems Present (VTE, DVT, PE) pulmonary embolism

## 2017-07-02 NOTE — PROGRESS NOTES
"Anticoagulation by Pharmacy - Warfarin    Stephani Bush is a 89 y.o.female  [Ht: 62\" (157.5 cm); Wt: 100 lb 6.4 oz (45.5 kg)] on Warfarin 2.5 mg PO  for indication of Pulmonary Thromboemolism.    Goal INR: 2-3  Today's INR:   Lab Results   Component Value Date    INR 1.00 07/02/2017         Lab Results   Component Value Date    INR 1.00 07/02/2017    INR 1.07 07/01/2017    INR 0.88 04/16/2017    PROTIME 13.1 07/02/2017    PROTIME 13.8 07/01/2017    PROTIME 11.8 04/16/2017     Lab Results   Component Value Date    HGB 9.7 (L) 07/02/2017    HGB 12.0 07/01/2017    HGB 11.3 (L) 04/16/2017     Lab Results   Component Value Date    HCT 29.6 (L) 07/02/2017    HCT 38.0 07/01/2017    HCT 33.5 (L) 04/16/2017     Assessment/Plan:  Reviewed above labs. INR is 1.  Reviewed medication list. Concurrent medications include aspirin, enoxaparin and levofloxacin.  Pt did receive dose of warfarin last night.  Will continue current dose of  2.5 mg. Rx will continue to follow and adjust dose accordingly.      Sophie Cast [facility]  07/02/17 1:44 PM     "

## 2017-07-02 NOTE — PROGRESS NOTES
Progress Note  Ron Smith MD  Hospitalist     LOS: 1 day   Patient Care Team:  Mary Carmen Robles DO as PCP - General    Chief Complaint: dyspnea    Subjective     Interval History:     Patient Complaints: shortness of breath / some better now    History taken from: patient / chart    Medication Review:   Current Facility-Administered Medications   Medication Dose Route Frequency Provider Last Rate Last Dose   • amitriptyline (ELAVIL) tablet 10 mg  10 mg Oral Nightly Rodrigo Duvall MD   10 mg at 07/01/17 2207   • artificial tears ophthalmic ointment   Both Eyes PRN Rodrigo Duvall MD       • aspirin chewable tablet 81 mg  81 mg Oral Daily Rodrigo Duvall MD   81 mg at 07/02/17 0803   • enoxaparin (LOVENOX) syringe 50 mg  1 mg/kg Subcutaneous Q12H Rodrigo Duvall MD   50 mg at 07/02/17 0803   • ferrous sulfate EC tablet 324 mg  324 mg Oral Daily With Breakfast Rodrigo Duvall MD   324 mg at 07/02/17 0803   • hydrALAZINE (APRESOLINE) injection 10 mg  10 mg Intravenous Q6H PRN Rodrigo Duvall MD       • HYDROcodone-acetaminophen (NORCO)  MG per tablet 1 tablet  1 tablet Oral Q6H PRN Rodrigo Duvall MD   1 tablet at 07/02/17 0345   • ipratropium-albuterol (DUO-NEB) nebulizer solution 3 mL  3 mL Nebulization 4x Daily - RT Rodrigo Duvall MD   3 mL at 07/02/17 1040   • levoFLOXacin (LEVAQUIN) 500 mg/100 mL D5W (premix) (LEVAQUIN) 500 mg  500 mg Intravenous Q24H Favio Matt MD   500 mg at 07/01/17 2300   • magic mouthwash with nystatin oral supsension 10 mL  10 mL Swish & Swallow Q4H PRN Rodrigo Duvall MD       • magnesium hydroxide (MILK OF MAGNESIA) suspension 2400 mg/10mL 10 mL  10 mL Oral Daily PRN Rodrigo Duvall MD       • Morphine sulfate (PF) injection 2 mg  2 mg Intravenous Q4H PRN Rodrigo Duvall MD       • pantoprazole (PROTONIX) EC tablet 40 mg  40 mg Oral Daily Rodrigo Duvall MD   40 mg at 07/02/17 0803   • Pharmacy to Dose enoxaparin (LOVENOX)   Does not apply  Continuous PRN Rodrigo Duvall MD       • Pharmacy to dose warfarin   Does not apply Continuous PRN Rodrigo Duvall MD       • predniSONE (DELTASONE) tablet 5 mg  5 mg Oral Daily Rodrigo Duvall MD   5 mg at 07/02/17 0803   • senna (SENOKOT) tablet 8.6 mg  1 tablet Oral Nightly Rodrigo uDvall MD   8.6 mg at 07/01/17 2207   • sodium chloride 0.9 % flush 1-10 mL  1-10 mL Intravenous PRN Rodrigo Duvall MD       • sodium chloride 0.9 % flush 10 mL  10 mL Intravenous PRN Aneesh Henry MD       • sodium chloride 0.9 % infusion  50 mL/hr Intravenous Continuous Ron Smith  mL/hr at 07/01/17 1855 100 mL/hr at 07/01/17 1855       Review of Systems:   Review of Systems   Constitutional: Positive for fatigue. Negative for fever.   Respiratory: Positive for cough and shortness of breath. Negative for choking and wheezing.    Cardiovascular: Positive for chest pain and leg swelling. Negative for palpitations.   Gastrointestinal: Negative for abdominal distention, abdominal pain, constipation, diarrhea, nausea and vomiting.   Musculoskeletal: Positive for back pain and neck pain.   Skin: Positive for pallor.   Neurological: Positive for dizziness, weakness and light-headedness.   Psychiatric/Behavioral: Negative for agitation, behavioral problems and confusion.   All other systems reviewed and are negative.      Objective     Vital Signs  Temp:  [96.9 °F (36.1 °C)-97.4 °F (36.3 °C)] 97.3 °F (36.3 °C)  Heart Rate:  [] 100  Resp:  [16-20] 18  BP: (107-148)/(55-75) 111/63    Physical Exam:  Physical Exam   Constitutional: She is oriented to person, place, and time. She appears well-developed and well-nourished. No distress.   HENT:   Head: Normocephalic and atraumatic.   Eyes: EOM are normal. Pupils are equal, round, and reactive to light. No scleral icterus.   Neck: Normal range of motion. Neck supple. No thyromegaly present.   Cardiovascular: Normal rate.    Pulmonary/Chest: Effort normal and breath  sounds normal. No respiratory distress. She has no wheezes.   Abdominal: Soft. Bowel sounds are normal. She exhibits no distension. There is no tenderness.   Musculoskeletal: She exhibits edema (LLE ) and tenderness (LLE).   Neurological: She is alert and oriented to person, place, and time. No cranial nerve deficit.   Skin: Skin is dry. No erythema. There is pallor.   Psychiatric: She has a normal mood and affect. Her behavior is normal.   Vitals reviewed.       Results Review:    Lab Results (last 24 hours)     Procedure Component Value Units Date/Time    CBC Auto Differential [[id number]]  (Abnormal) Collected:  07/01/17 1613    Specimen:  Blood Updated:  07/01/17 1630     WBC 8.50 10*3/mm3      RBC 3.87 10*6/mm3      Hemoglobin 12.0 g/dL      Hematocrit 38.0 %      MCV 98.2 (H) fL      MCH 31.0 pg      MCHC 31.6 g/dL      RDW 13.8 %      RDW-SD 49.3 (H) fl      MPV 9.7 fL      Platelets 250 10*3/mm3      Neutrophil % 88.8 (H) %      Lymphocyte % 5.9 (L) %      Monocyte % 4.6 %      Eosinophil % 0.0 %      Basophil % 0.2 %      Immature Grans % 0.5 %      Neutrophils, Absolute 7.55 10*3/mm3      Lymphocytes, Absolute 0.50 (L) 10*3/mm3      Monocytes, Absolute 0.39 10*3/mm3      Eosinophils, Absolute 0.00 10*3/mm3      Basophils, Absolute 0.02 10*3/mm3      Immature Grans, Absolute 0.04 (H) 10*3/mm3      nRBC 0.0 /100 WBC     CK [[id number]]  (Normal) Collected:  07/01/17 1613    Specimen:  Blood Updated:  07/01/17 1637     Creatine Kinase 77 U/L     Lactic Acid, Plasma [[id number]]  (Abnormal) Collected:  07/01/17 1613    Specimen:  Blood Updated:  07/01/17 1639     Lactate 2.2 (C) mmol/L     Protime-INR [[id number]]  (Normal) Collected:  07/01/17 1613    Specimen:  Blood Updated:  07/01/17 1645     Protime 13.8 Seconds      INR 1.07    Narrative:       Therapeutic range for most indications is 2.0-3.0 INR,  or 2.5-3.5 for mechanical heart valves.    CK-MB [[id number]]  (Normal) Collected:  07/01/17 6822    Specimen:   Blood Updated:  07/01/17 1646     CKMB 2.35 ng/mL     CBC & Differential [[id number]] Collected:  07/01/17 1613    Specimen:  Blood Updated:  07/01/17 1647    Narrative:       The following orders were created for panel order CBC & Differential.  Procedure                               Abnormality         Status                     ---------                               -----------         ------                     Scan Slide[[id number]]                                       Final result               CBC Auto Differential[[id number]]        Abnormal            Final result                 Please view results for these tests on the individual orders.    Scan Slide [[id number]] Collected:  07/01/17 1613    Specimen:  Blood Updated:  07/01/17 1647     RBC Morphology Normal     WBC Morphology Normal     Platelet Estimate Adequate     Clumped Platelets --      NONE SEEN       D-dimer, Quantitative [[id number]]  (Abnormal) Collected:  07/01/17 1613    Specimen:  Blood Updated:  07/01/17 1657     D-Dimer, Quantitative >4000 (H) ng/mL (FEU)     Narrative:       Dimer values <500 ng/ml FEU are FDA approved as aid in diagnosis of deep venous thrombosis and pulmonary embolism.  This test should not be used in an exclusion strategy with pretest probability alone.    A recent guideline regarding diagnosis for pulmonary thomboembolism recommends an adjusted exclusion criterion of age x 10 ng/ml FEU for patients >50 years of age ([employer] Med 2015; 163: 701-711).    Troponin [[id number]]  (Normal) Collected:  07/01/17 1646    Specimen:  Blood Updated:  07/01/17 1715     Troponin I 0.017 ng/mL     TSH [[id number]]  (Normal) Collected:  07/01/17 1646    Specimen:  Blood Updated:  07/01/17 1735     TSH 1.210 mIU/mL     Urinalysis With / Culture If Indicated [[id number]]  (Abnormal) Collected:  07/01/17 1713    Specimen:  Urine from Urine, Clean Catch Updated:  07/01/17 1736     Color, UA Yellow     Appearance, UA Clear     pH, UA 7.0      Specific Gravity, UA 1.015     Glucose, UA Negative     Ketones, UA Negative     Bilirubin, UA Negative     Blood, UA Trace (A)     Protein, UA Negative     Leuk Esterase, UA Small (1+) (A)     Nitrite, UA Negative     Urobilinogen, UA 1.0 E.U./dL    Urinalysis, Microscopic Only [[id number]]  (Abnormal) Collected:  07/01/17 1713    Specimen:  Urine from Urine, Clean Catch Updated:  07/01/17 1749     RBC, UA 0-2 (A) /HPF      WBC, UA Too Numerous to Count (A) /HPF      Bacteria, UA 4+ (A) /HPF      Squamous Epithelial Cells, UA 6-12 (A) /HPF      Hyaline Casts, UA None Seen /LPF      Mucus, UA Trace /HPF      Methodology Manual Light Microscopy    Comprehensive Metabolic Panel [[id number]]  (Abnormal) Collected:  07/01/17 1729    Specimen:  Blood from Arm, Left Updated:  07/01/17 1752     Glucose 115 (H) mg/dL      BUN 15 mg/dL      Creatinine 0.95 mg/dL      Sodium 141 mmol/L      Potassium 3.8 mmol/L      Chloride 109 mmol/L      CO2 21.0 (L) mmol/L      Calcium 8.5 mg/dL      Total Protein 5.6 (L) g/dL      Albumin 3.10 (L) g/dL      ALT (SGPT) 31 U/L      AST (SGOT) 26 U/L      Alkaline Phosphatase 69 U/L      Total Bilirubin 0.3 mg/dL      eGFR Non African Amer 55 mL/min/1.73      Globulin 2.5 gm/dL      A/G Ratio 1.2 g/dL      BUN/Creatinine Ratio 15.8     Anion Gap 11.0 mmol/L     Narrative:       The MDRD GFR formula is only valid for adults with stable renal function between ages 18 and 70.    [facility] - SST [[id number]] Collected:  07/01/17 1613    Specimen:  Blood Updated:  07/01/17 1801     Extra Tube Hold for add-ons.      Auto resulted.       [facility] [[id number]] Collected:  07/01/17 1613    Specimen:  Blood Updated:  07/01/17 1801    Narrative:       The following orders were created for panel order [facility].  Procedure                               Abnormality         Status                     ---------                               -----------         ------                     Light Blue  Top[[id number]]                                   Final result               Green Top (Gel)[[id number]]                                  Final result               Lavender Top[[id number]]                                     Final result               Gold Top - SST[[id number]]                                   Final result                 Please view results for these tests on the individual orders.    Light Blue Top [[id number]] Collected:  07/01/17 1613    Specimen:  Blood Updated:  07/01/17 1801     Extra Tube hold for add-on      Auto resulted       Green Top (Gel) [[id number]] Collected:  07/01/17 1613    Specimen:  Blood Updated:  07/01/17 1801     Extra Tube Hold for add-ons.      Auto resulted.       Lavender Top [[id number]] Collected:  07/01/17 1613    Specimen:  Blood Updated:  07/01/17 1801     Extra Tube hold for add-on      Auto resulted       Lactate Acid, Reflex [[id number]]  (Normal) Collected:  07/01/17 1954    Specimen:  Blood from Hand, Left Updated:  07/01/17 2012     Lactate 1.9 mmol/L     CBC (No Diff) [[id number]]  (Abnormal) Collected:  07/02/17 0520    Specimen:  Blood Updated:  07/02/17 0557     WBC 7.87 10*3/mm3      RBC 3.05 (L) 10*6/mm3      Hemoglobin 9.7 (L) g/dL       Verified results repeat analysis        Hematocrit 29.6 (L) %      MCV 97.0 fL      MCH 31.8 pg      MCHC 32.8 g/dL      RDW 14.3 %      RDW-SD 51.0 (H) fl      MPV 8.7 fL      Platelets 252 10*3/mm3     Basic Metabolic Panel [[id number]]  (Abnormal) Collected:  07/02/17 0520    Specimen:  Blood Updated:  07/02/17 0603     Glucose 83 mg/dL      BUN 13 mg/dL      Creatinine 0.90 mg/dL      Sodium 142 mmol/L      Potassium 4.3 mmol/L      Chloride 110 mmol/L      CO2 22.0 mmol/L      Calcium 8.3 (L) mg/dL      eGFR Non African Amer 59 mL/min/1.73      BUN/Creatinine Ratio 14.4     Anion Gap 10.0 mmol/L     Narrative:       The MDRD GFR formula is only valid for adults with stable renal function between ages 18 and 70.    Urine  Culture [[id number]]  (Normal) Collected:  07/01/17 1713    Specimen:  Urine from Urine, Clean Catch Updated:  07/02/17 0658     Urine Culture Culture in progress          Imaging Results (last 24 hours)     Procedure Component Value Units Date/Time    XR Abdomen 2 View With Chest 1 View [[id number]] Collected:  07/01/17 1733     Updated:  07/01/17 1751    Narrative:         EXAMINATION:  Acute abdominal series              VIEWS:  Chest:  1 ; Abdo:  2   DATE/TIME:     7/1/2017 5:48 PM CDT      INDICATION:  Fatigue, diffuse abdominal pain               COMPARISON EXAMINATION:    none                              FINDINGS:                             Chest:      heart size: within normal limits      mediastinal contour: within normal limits       lungs: no evidence of focal air space disease, grossly  negative      pleura:  no pleural fluid      osseous: Diffuse osteopenia, 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      misc:    Abdomen:      bowel gas pattern: nonobstructive      free air:  none visualized      calculi:   none visualized      osseous:  Multilevel vertebral body compression deformities.      misc:  Large amount of stool in the colon                            Impression:       CONCLUSION:        1. No gross evidence of an acute abdominal process.  2. Large amount of stool in the colon, consider  constipation/impaction..                                If pain or symptoms persist beyond reasonable expectations, a  contrast enhanced CT examination is suggested, as is deemed  clinical appropriate.                       Electronically signed by:  DEVENDRA Garner MD  7/1/2017 5:50 PM  CDT Workstation: [id number]    CT Angiogram Chest With Contrast [[id number]] Collected:  07/01/17 1753     Updated:  07/01/17 1822    Narrative:         EXAM:  Computed Tomography with CTA         REGION:  Chest       INDICATION:    dyspnea     - rule out pulmonary embolism       CLINICAL HISTORY:  CORRELATIVE IMAGING:   None                         TECHNIQUE:     - PE / vascular protocol     - reconstructions:  axial, coronal, sagittal, obliques     - computer-generated 3D reconstructions (MIPS) were performed.     - contrast:  intravenous Isovue 370, 56 mL                                 This exam was performed according to the departmental  dose-optimization program which includes automated exposure  control, adjustment of the mA and/or kV according to patient size  and/or use of iterative reconstruction technique.         COMMENTS:    - Pulmonary arterial system:      - Main pulmonary artery trunk:  negative      - Left, right main pulmonary arteries: Nonocclusive thrombus  in the right main      - Lobar arteries: Nonocclusive thrombus in the right lower  lobe , nonocclusive thrombus in the right upper lobe      - Segmental arteries: grossly negative.      - Systemic vascularity (as visualized):        - Aorta:  grossly negative / normal caliber / no dissection        - roots of great vessels:  grossly negative / normal  caliber        - SVC / IVC:  grossly negative / normal caliber     - Misc (limited visualization):      - pulmonary parenchyma:  Scattered airspace changes in the  right base.      - pleura:  negative      - mediastinal / fred:  negative      - neck, inferior:  grossly wnl      - subdiaphragmatic structures:  grossly negative (limited  evaluation)       - osseous:      - misc:       .        Impression:       CONCLUSION:          1.  Nonocclusive intraluminal thrombus primarily in the right  main pulmonary artery, right lower lobe artery, and right upper  lobe artery            2.  No evidence of pathology associated with the visualized  aorta.           3. Patchy airspace changes in the right base.    Critical result alert:  ------------------------  The results of examination have been discussed with the referring  healthcare provider, Elaine Wesley, with read back,  immediately following interpretation the  examination on 7/1/17 at  19:20 hours.                                    Electronically signed by:  DEVENDRA Garner MD  7/1/2017 6:21 PM  CDT Workstation: [id number]    US Venous Doppler Lower Extremity Left (duplex) [[id number]] Collected:  07/02/17 0832     Updated:  07/02/17 0914    Narrative:         PROCEDURE: Left lower extremity venous duplex    COMPARISON: None    HISTORY: Left lower extremity pain and swelling    FINDINGS: Realtime grayscale and color-flow imaging was performed  of the left lower extremity.    There is visualized thrombus in the proximal, mid, and distal  segments of the left femoral vein, and in the left popliteal  vein. The calf vessels are not adequately visualized. The common  femoral vein, greater saphenous vein, and deep femoral vein  appear patent.      Impression:       CONCLUSION:   Positive for DVT in the left femoral and popliteal veins. Patient  has a known pulmonary embolism.    Critical results discussed with EFRA MCKEON on 7/2/2017  9:10 AM CDT    Electronically signed by:  Brandon Kruger MD  7/2/2017 9:13 AM CDT  Workstation: [id number]          Assessment/Plan     Principal Problem:    Pulmonary embolism on right  Active Problems:    Acute deep vein thrombosis (DVT) of popliteal vein of left lower extremity    Acute deep vein thrombosis (DVT) of left femoral vein    UTI (urinary tract infection)    Continue with anticoagulation / IV antibiotics. She can be moved to regular floor / 3W with telemetry.    Ron Smith MD  07/02/17  11:03 AM

## 2017-07-02 NOTE — H&P
[facility] Medicine Admission      Date of Admission: 7/1/2017      Primary Care Physician: Mary Carmen Robles DO      Chief Complaint:  Left leg swelling and shortness of air    HPI:  This 89-year-old  female reported to the emergency department with complaints of worsening shortness of air.  Patient has a history of left leg swelling and pain for the last 3 months.  Over the past 2-3 days patient has become short of air.  Today the shortness of air became severe enough that she reported to the emergency department.  Patient was diagnosed with a right-sided pulmonary embolism without obstruction.  Ultrasound of the left lower extremity pending.  Patient and family deny any history of bleeding disorders or GI bleeds, however the patient is elderly and has experienced strokes and falls in the past.  This time we will initiate a Lovenox to Coumadin bridge.  Patient denies fever, chills, hematochezia, melena, hematemesis, hematuria.  Denies chest pain.  Endorses shortness of air.  No abdominal pain, though abdominal films show constipation.  Patient does admit to constipation.  No syncope, presyncope, or dizziness.    Concurrent Medical History:  has a past medical history of Artificial lens present; Astigmatism; Blepharitis; Myopia; Nonexudative age-related macular degeneration; Pain in eye; and Stroke.  Rheumatoid arthritis; history of chronic anemia, not anemic at this time; temporal arteritis; hypertension; fibromyalgia; history of stroke; osteoporosis; suspected TIA in October 2016.    Past Surgical History:  has no past surgical history on file.  Back surgery; appendectomy; partial hysterectomy; cataract surgery.    Family History: family history is not on file.  Family history significant for CVAs, lung cancer, and breast cancer    Social History:  reports that she has never smoked. She does not have any smokeless tobacco history on file. She reports that she does  not drink alcohol or use illicit drugs.    Allergies:   Allergies   Allergen Reactions   • Ciprofloxacin    • Sulfa Antibiotics Nausea Only   • Lincomycin Rash   • Other Rash     Ultra Cee   • Penicillins Rash     Review of Systems:  Review of Systems   Constitutional: Positive for fatigue. Negative for chills and fever.   Respiratory: Positive for shortness of breath. Negative for cough, chest tightness and wheezing.    Cardiovascular: Positive for leg swelling. Negative for chest pain and palpitations.   Gastrointestinal: Positive for constipation. Negative for abdominal pain, blood in stool, diarrhea, nausea and vomiting.   Genitourinary: Negative for decreased urine volume, difficulty urinating, dysuria, flank pain, hematuria and urgency.   Musculoskeletal: Positive for back pain.   Neurological: Negative for dizziness, tremors, seizures, syncope, facial asymmetry, speech difficulty, weakness, light-headedness, numbness and headaches.   Psychiatric/Behavioral: Negative for confusion.      Otherwise complete ROS is negative except as mentioned above.    Physical Exam:   Temp:  [97.4 °F (36.3 °C)] 97.4 °F (36.3 °C)  Heart Rate:  [110-117] 110  Resp:  [20] 20  BP: (107-148)/(55-75) 107/55  Physical Exam   Constitutional: She is oriented to person, place, and time. She appears well-developed and well-nourished. No distress.   HENT:   Head: Normocephalic and atraumatic.   Mouth/Throat: Oropharynx is clear and moist. No oropharyngeal exudate.   Eyes: Conjunctivae and EOM are normal. Pupils are equal, round, and reactive to light. Right eye exhibits no discharge. Left eye exhibits no discharge.   Cardiovascular: Regular rhythm.    Tachycardia   Pulmonary/Chest: Effort normal. No respiratory distress.   Decreased breath sounds   Abdominal: Soft. Bowel sounds are normal. She exhibits no distension. There is no tenderness.   Musculoskeletal: She exhibits edema.   LLE edema, pedal edema  Distal pulses intact  Capillary  refill intact  Sensation intact   Neurological: She is alert and oriented to person, place, and time.   Skin: Skin is warm and dry. She is not diaphoretic.   Psychiatric: She has a normal mood and affect. Her behavior is normal.       Results Reviewed:  I have personally reviewed current lab, radiology, and data and agree with results.  Lab Results (last 24 hours)     Procedure Component Value Units Date/Time    CBC Auto Differential [[id number]]  (Abnormal) Collected:  07/01/17 1613    Specimen:  Blood Updated:  07/01/17 1630     WBC 8.50 10*3/mm3      RBC 3.87 10*6/mm3      Hemoglobin 12.0 g/dL      Hematocrit 38.0 %      MCV 98.2 (H) fL      MCH 31.0 pg      MCHC 31.6 g/dL      RDW 13.8 %      RDW-SD 49.3 (H) fl      MPV 9.7 fL      Platelets 250 10*3/mm3      Neutrophil % 88.8 (H) %      Lymphocyte % 5.9 (L) %      Monocyte % 4.6 %      Eosinophil % 0.0 %      Basophil % 0.2 %      Immature Grans % 0.5 %      Neutrophils, Absolute 7.55 10*3/mm3      Lymphocytes, Absolute 0.50 (L) 10*3/mm3      Monocytes, Absolute 0.39 10*3/mm3      Eosinophils, Absolute 0.00 10*3/mm3      Basophils, Absolute 0.02 10*3/mm3      Immature Grans, Absolute 0.04 (H) 10*3/mm3      nRBC 0.0 /100 WBC     CK [[id number]]  (Normal) Collected:  07/01/17 1613    Specimen:  Blood Updated:  07/01/17 1637     Creatine Kinase 77 U/L     Lactic Acid, Plasma [[id number]]  (Abnormal) Collected:  07/01/17 1613    Specimen:  Blood Updated:  07/01/17 1639     Lactate 2.2 (C) mmol/L     Protime-INR [[id number]]  (Normal) Collected:  07/01/17 1613    Specimen:  Blood Updated:  07/01/17 1645     Protime 13.8 Seconds      INR 1.07    Narrative:       Therapeutic range for most indications is 2.0-3.0 INR,  or 2.5-3.5 for mechanical heart valves.    CK-MB [[id number]]  (Normal) Collected:  07/01/17 1613    Specimen:  Blood Updated:  07/01/17 1646     CKMB 2.35 ng/mL     CBC & Differential [[id number]] Collected:  07/01/17 1613    Specimen:  Blood Updated:   07/01/17 1647    Narrative:       The following orders were created for panel order CBC & Differential.  Procedure                               Abnormality         Status                     ---------                               -----------         ------                     Scan Slide[[id number]]                                       Final result               CBC Auto Differential[[id number]]        Abnormal            Final result                 Please view results for these tests on the individual orders.    Scan Slide [[id number]] Collected:  07/01/17 1613    Specimen:  Blood Updated:  07/01/17 1647     RBC Morphology Normal     WBC Morphology Normal     Platelet Estimate Adequate     Clumped Platelets --      NONE SEEN       D-dimer, Quantitative [[id number]]  (Abnormal) Collected:  07/01/17 1613    Specimen:  Blood Updated:  07/01/17 1657     D-Dimer, Quantitative >4000 (H) ng/mL (FEU)     Narrative:       Dimer values <500 ng/ml FEU are FDA approved as aid in diagnosis of deep venous thrombosis and pulmonary embolism.  This test should not be used in an exclusion strategy with pretest probability alone.    A recent guideline regarding diagnosis for pulmonary thomboembolism recommends an adjusted exclusion criterion of age x 10 ng/ml FEU for patients >50 years of age ([employer] Intern Med 2015; 163: 701-711).    Troponin [[id number]]  (Normal) Collected:  07/01/17 1646    Specimen:  Blood Updated:  07/01/17 1715     Troponin I 0.017 ng/mL     TSH [[id number]]  (Normal) Collected:  07/01/17 1646    Specimen:  Blood Updated:  07/01/17 1735     TSH 1.210 mIU/mL     Urine Culture [[id number]] Collected:  07/01/17 1713    Specimen:  Urine from Urine, Clean Catch Updated:  07/01/17 1735    Urinalysis With / Culture If Indicated [[id number]]  (Abnormal) Collected:  07/01/17 1713    Specimen:  Urine from Urine, Clean Catch Updated:  07/01/17 1736     Color, UA Yellow     Appearance, UA Clear     pH, UA 7.0     Specific  Gravity, UA 1.015     Glucose, UA Negative     Ketones, UA Negative     Bilirubin, UA Negative     Blood, UA Trace (A)     Protein, UA Negative     Leuk Esterase, UA Small (1+) (A)     Nitrite, UA Negative     Urobilinogen, UA 1.0 E.U./dL    Urinalysis, Microscopic Only [[id number]]  (Abnormal) Collected:  07/01/17 1713    Specimen:  Urine from Urine, Clean Catch Updated:  07/01/17 1749     RBC, UA 0-2 (A) /HPF      WBC, UA Too Numerous to Count (A) /HPF      Bacteria, UA 4+ (A) /HPF      Squamous Epithelial Cells, UA 6-12 (A) /HPF      Hyaline Casts, UA None Seen /LPF      Mucus, UA Trace /HPF      Methodology Manual Light Microscopy    Comprehensive Metabolic Panel [[id number]]  (Abnormal) Collected:  07/01/17 1729    Specimen:  Blood from Arm, Left Updated:  07/01/17 1752     Glucose 115 (H) mg/dL      BUN 15 mg/dL      Creatinine 0.95 mg/dL      Sodium 141 mmol/L      Potassium 3.8 mmol/L      Chloride 109 mmol/L      CO2 21.0 (L) mmol/L      Calcium 8.5 mg/dL      Total Protein 5.6 (L) g/dL      Albumin 3.10 (L) g/dL      ALT (SGPT) 31 U/L      AST (SGOT) 26 U/L      Alkaline Phosphatase 69 U/L      Total Bilirubin 0.3 mg/dL      eGFR Non African Amer 55 mL/min/1.73      Globulin 2.5 gm/dL      A/G Ratio 1.2 g/dL      BUN/Creatinine Ratio 15.8     Anion Gap 11.0 mmol/L     Narrative:       The MDRD GFR formula is only valid for adults with stable renal function between ages 18 and 70.    [facility] [[id number]] Collected:  07/01/17 1613    Specimen:  Blood Updated:  07/01/17 1801     Extra Tube Hold for add-ons.      Auto resulted.       [place] Draw [[id number]] Collected:  07/01/17 1613    Specimen:  Blood Updated:  07/01/17 1801    Narrative:       The following orders were created for panel order [place] Draw.  Procedure                               Abnormality         Status                     ---------                               -----------         ------                     Light Blue Top[[id number]]                                    Final result               Green Top (Gel)[[id number]]                                  Final result               Lavender Top[[id number]]                                     Final result               Gold Top - SST[[id number]]                                   Final result                 Please view results for these tests on the individual orders.    Light Blue Top [[id number]] Collected:  07/01/17 1613    Specimen:  Blood Updated:  07/01/17 1801     Extra Tube hold for add-on      Auto resulted       Green Top (Gel) [[id number]] Collected:  07/01/17 1613    Specimen:  Blood Updated:  07/01/17 1801     Extra Tube Hold for add-ons.      Auto resulted.       Lavender Top [[id number]] Collected:  07/01/17 1613    Specimen:  Blood Updated:  07/01/17 1801     Extra Tube hold for add-on      Auto resulted           Imaging Results (last 24 hours)     Procedure Component Value Units Date/Time    XR Abdomen 2 View With Chest 1 View [[id number]] Collected:  07/01/17 1733     Updated:  07/01/17 1751    Narrative:         EXAMINATION:  Acute abdominal series              VIEWS:  Chest:  1 ; Abdo:  2   DATE/TIME:     7/1/2017 5:48 PM CDT      INDICATION:  Fatigue, diffuse abdominal pain               COMPARISON EXAMINATION:    none                              FINDINGS:                             Chest:      heart size: within normal limits      mediastinal contour: within normal limits       lungs: no evidence of focal air space disease, grossly  negative      pleura:  no pleural fluid      osseous: Diffuse osteopenia, 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      misc:    Abdomen:      bowel gas pattern: nonobstructive      free air:  none visualized      calculi:   none visualized      osseous:  Multilevel vertebral body compression deformities.      misc:  Large amount of stool in the colon                            Impression:       CONCLUSION:        1. No gross evidence of an acute  abdominal process.  2. Large amount of stool in the colon, consider  constipation/impaction..                                If pain or symptoms persist beyond reasonable expectations, a  contrast enhanced CT examination is suggested, as is deemed  clinical appropriate.                       Electronically signed by:  DEVENDRA Garner MD  7/1/2017 5:50 PM  CDT Workstation: [id number]    CT Angiogram Chest With Contrast [[id number]] Collected:  07/01/17 1753     Updated:  07/01/17 1822    Narrative:         EXAM:  Computed Tomography with CTA         REGION:  Chest       INDICATION:    dyspnea     - rule out pulmonary embolism       CLINICAL HISTORY:  CORRELATIVE IMAGING:  None                         TECHNIQUE:     - PE / vascular protocol     - reconstructions:  axial, coronal, sagittal, obliques     - computer-generated 3D reconstructions (MIPS) were performed.     - contrast:  intravenous Isovue 370, 56 mL                                 This exam was performed according to the departmental  dose-optimization program which includes automated exposure  control, adjustment of the mA and/or kV according to patient size  and/or use of iterative reconstruction technique.         COMMENTS:    - Pulmonary arterial system:      - Main pulmonary artery trunk:  negative      - Left, right main pulmonary arteries: Nonocclusive thrombus  in the right main      - Lobar arteries: Nonocclusive thrombus in the right lower  lobe , nonocclusive thrombus in the right upper lobe      - Segmental arteries: grossly negative.      - Systemic vascularity (as visualized):        - Aorta:  grossly negative / normal caliber / no dissection        - roots of great vessels:  grossly negative / normal  caliber        - SVC / IVC:  grossly negative / normal caliber     - Misc (limited visualization):      - pulmonary parenchyma:  Scattered airspace changes in the  right base.      - pleura:  negative      - mediastinal / fred:  negative       - neck, inferior:  grossly wnl      - subdiaphragmatic structures:  grossly negative (limited  evaluation)       - osseous:      - misc:       .        Impression:       CONCLUSION:          1.  Nonocclusive intraluminal thrombus primarily in the right  main pulmonary artery, right lower lobe artery, and right upper  lobe artery            2.  No evidence of pathology associated with the visualized  aorta.           3. Patchy airspace changes in the right base.    Critical result alert:  ------------------------  The results of examination have been discussed with the referring  healthcare provider, Elaine Wesley, with read back,  immediately following interpretation the examination on 7/1/17 at  19:20 hours.                                    Electronically signed by:  DEVENDRA Garner MD  7/1/2017 6:21 PM  CDT Workstation: [id number]            Assessment:    Hospital Problem List     Pulmonary embolism without acute cor pulmonale              Plan:  Lovenox to Coumadin bridge; ultrasound of the left lower extremity to investigate for DVT.  Though patient technically meet sepsis criteria, her tachycardia is secondary to pulmonary embolism.  Urine only shows trace leuk esterase and has multiple swan the cells, possible contaminant.  Patient will be placed on ceftriaxone and azithromycin given the possible patchy opacity found on radiographical films.  Case management consulted as family and patient would like nursing home placement.  We will recheck lactic acid, a.m. labs, continue to treat as hospital course dictates.          This document has been electronically signed by YAMILEX Yao on July 1, 2017 7:19 PM              Addendum: Outpatient medications include Elavil, Plavix, ferrous sulfate, Prinzide, Megace, Protonix, aspirin, calcium carbonate, vitamin C, multivitamin, Senokot, prednisone, and potassium chloride.

## 2017-07-03 NOTE — PLAN OF CARE
Problem: Patient Care Overview (Adult)  Goal: Plan of Care Review  Outcome: Ongoing (interventions implemented as appropriate)    07/02/17 1559 07/02/17 1935   Coping/Psychosocial Response Interventions   Plan Of Care Reviewed With --  patient   Patient Care Overview   Progress no change --        Goal: Adult Individualization and Mutuality  Outcome: Ongoing (interventions implemented as appropriate)  Goal: Discharge Needs Assessment  Outcome: Ongoing (interventions implemented as appropriate)    Problem: VTE, DVT and PE (Adult)  Goal: Signs and Symptoms of Listed Potential Problems Will be Absent or Manageable (VTE, DVT and PE)  Outcome: Ongoing (interventions implemented as appropriate)

## 2017-07-03 NOTE — THERAPY EVALUATION
Acute Care - Physical Therapy Initial Evaluation  [facility]     Patient Name: Stephani Bush  : 1926  MRN: [id number]  Today's Date: 7/3/2017   Onset of Illness/Injury or Date of Surgery Date: 17  Date of Referral to PT: 17  Referring Physician: Marisol      Admit Date: 2017     Visit Dx:    ICD-10-CM ICD-9-CM   1. Other acute pulmonary embolism without acute cor pulmonale I26.99    2. Sepsis secondary to UTI A41.9 038.9    N39.0 995.91     599.0   3. Impaired functional mobility, balance, gait, and endurance Z74.09 V49.89     Patient Active Problem List   Diagnosis   • Acute deep vein thrombosis (DVT) of popliteal vein of left lower extremity   • UTI (urinary tract infection)   • Pulmonary embolism on right   • Acute deep vein thrombosis (DVT) of left femoral vein     Past Medical History:   Diagnosis Date   • Astigmatism    • Blepharitis    • Myopia    • Nonexudative age-related macular degeneration    • Stroke      History reviewed. No pertinent surgical history.       PT ASSESSMENT (last 72 hours)      PT Evaluation       17 1545 17 0849    Rehab Evaluation    Document Type evaluation  -GB     Subjective Information agree to therapy  -GB     Patient Effort, Rehab Treatment good  -GB     Symptoms Noted Comment pt hurts to the touch, hurts w/ position change; reports chronic pain; appears very fragile  -GB     General Information    Patient Profile Review yes  -GB     Onset of Illness/Injury or Date of Surgery Date 17  -GB     Referring Physician Marisol  -GB     General Observations drowsy initially w/ ; RN says ok to see   -GB     Pertinent History Of Current Problem multiple strokes make speech slurred; prior NH resident  -GB     Precautions/Limitations fall precautions;cardiac precautions  -GB     Prior Level of Function mod assist:;all household mobility;ADL's   states at one time she could care for self but strokes   -GB     Equipment Currently Used  at Home wheelchair;oxygen   cant walk; dtr has to use a walker; physical issues in famil  -GB oxygen   [employer] 2L at night  -KF    Plans/Goals Discussed With patient   home able to care for self; workd all her life and miss it  -GB     Risks Reviewed patient:;increased discomfort;change in vital signs  -GB     Benefits Reviewed patient:;increase independence;improve function;increase strength  -GB     Barriers to Rehab medically complex;previous functional deficit  -GB     Living Environment    Lives With child(jimmy), adult  -GB     Home Accessibility stairs to enter home;ramps present at home   dominguezter tilts w/chair back to get wheels on curb  -GB     Number of Stairs to Enter Home 1  -GB     Transportation Available car  -GB     Living Environment Comment graddaughter heps pt w/ her care  -GB     Clinical Impression    Date of Referral to PT 07/03/17  -GB     PT Diagnosis sepsis due ot UIT; PE; DVT; hx multiple strokes  -GB     Prognosis fair-good  -GB     Functional Level At Time Of Evaluation needs assist  -GB     Patient/Family Goals Statement pt wants to go home again  -GB     Criteria for Skilled Therapeutic Interventions Met yes  -GB     Pathology/Pathophysiology Noted (Describe Specifically for Each System) musculoskeletal  -GB     Rehab Potential good, to achieve stated therapy goals  -GB     Predicted Duration of Therapy Intervention (days/wks) til d/c  -GB     Vital Signs    Pre Systolic BP Rehab 120  -GB     Pre Treatment Diastolic BP 62  -GB     Post Systolic BP Rehab 130  -GB     Post Treatment Diastolic BP 78  -GB     Pretreatment Heart Rate (beats/min) 110  -GB     Intratreatment Heart Rate (beats/min) 115  -GB     Posttreatment Heart Rate (beats/min) 112  -GB     Pre SpO2 (%) 96  -GB     O2 Delivery Pre Treatment supplemental O2  -GB     Post SpO2 (%) 97  -GB     O2 Delivery Post Treatment supplemental O2  -GB     Pre Patient Position Supine  -GB     Intra Patient Position Sitting  -GB     Post  Patient Position Supine  -     Pain Assessment    Pain Assessment 0-10  -     Pain Score 5  -GB     Pain Type Chronic pain  -GB     Pain Location Leg  -GB     Pain Orientation Left   L hurts more than R LE: swelling periiodically  -GB     Multiple Pain Sites Yes  -     Vision Assessment/Intervention    Visual Impairment WFL with corrective lenses  -     Cognitive Assessment/Intervention    Current Cognitive/Communication Assessment functional  -     Orientation Status oriented to;person;place;situation  -     Follows Commands/Answers Questions 100% of the time;able to follow single-step instructions  -     Personal Safety WNL/WFL  -     Personal Safety Interventions fall prevention program maintained;supervised activity;nonskid shoes/slippers when out of bed   pt deferred gait belt due to it causes discomfort prior time  -     ROM (Range of Motion)    General ROM upper extremity range of motion deficits identified  -     General ROM Detail R index finger limited flx; arthtici hands julee; shoulder flx julee ~80 deg; R foot in PF ~25 deg but allows some DF passively  -     MMT (Manual Muscle Testing)    General MMT Assessment neck/trunk strength deficits identified;lower extremity strength deficits identified   UE ilya mild flx/ext resistance  -     General MMT Assessment Detail shoulders & elbows flx/ext 3+/5;  3/5 incomplete index flx; LE's ilya 3+--4/5 hip & knee flx; R foot held in PF with slight initiation DF if asked; L foot DF/PF 3+-4-/5  -     Bed Mobility, Assessment/Treatment    Bed Mobility, Roll Left, [place] conditional independence  -     Bed Mobility, Roll Right, [place] conditional independence  -     Bed Mobility, Scoot/Bridge, [place] conditional independence   ability for short distance scooting;   -     Bed Mob, Supine to Sit, [place] moderate assist (50% patient effort)  -     Bed Mob, Sit to Supine, [place] moderate assist (50% patient  effort)  -GB     Bed Mobility, Impairments impaired balance;strength decreased;postural control impaired  -GB     Transfer Assessment/Treatment    Transfers, Bed-Chair [place] unable to perform;not appropriate to assess  -GB     Transfers, Chair-Bed [place] unable to perform  -GB     Transfer, Comment pt could barely stand w/ mod assist x 1 coming up partially to stand;   -GB     Gait Assessment/Treatment    Gait, Distance (Feet) 0  -GB     Sensory Assessment/Intervention    Light Touch LUE;RUE;LLE;RLE   intact  -GB     Edema Management    Edema Amount left:;trace   very tender to touch; venous distention significant LLE  -GB     Edema Interventions elevation;LLE:;RLE:  -GB     Positioning and Restraints    Pre-Treatment Position in bed  -GB     Post Treatment Position bed  -GB     In Bed supine;call light within reach;encouraged to call for assist;exit alarm on;patient within staff view;with nsg;side rails up x2  -GB       07/01/17 2039 07/01/17 2035    General Information    Equipment Currently Used at Home  wheelchair;oxygen  -LR    Living Environment    Lives With child(jimmy), adult;other relative(s) (specify)  -LR     Living Arrangements house  -LR     Home Accessibility no concerns  -LR     Stair Railings at Home other (see comments)  -LR     Type of Financial/Environmental Concern none  -LR     Transportation Available family or friend will provide  -LR     Living Environment Comment wheelchair ramp going into house, all her needs on ground floor  -LR       User Key  (r) = Recorded By, (t) = Taken By, (c) = Cosigned By    Initials Name Provider Type    TREVOR Blake, PT Physical Therapist    LR Cahparrita Marcelino, RN Registered Nurse    SHARITA RobbinsW           Physical Therapy Education     Title: PT OT SLP Therapies (Active)     Topic: Physical Therapy (Active)     Point: Mobility training (Active)    Learning Progress Summary    Learner Readiness Method Response  Comment Documented by Status   Patient Acceptance E,D NR POC; mobility; sit to stand  07/03/17 1656 Active               Point: Home exercise program (Active)    Learning Progress Summary    Learner Readiness Method Response Comment Documented by Status   Patient Acceptance E,D NR POC; mobility; sit to stand  07/03/17 1656 Active               Point: Body mechanics (Active)    Learning Progress Summary    Learner Readiness Method Response Comment Documented by Status   Patient Acceptance E,D NR POC; mobility; sit to stand  07/03/17 1656 Active               Point: Precautions (Active)    Learning Progress Summary    Learner Readiness Method Response Comment Documented by Status   Patient Acceptance E,D NR POC; mobility; sit to stand  07/03/17 1656 Active                      User Key     Initials Effective Dates Name Provider Type Discipline     10/17/16 -  Irina Blake, PT Physical Therapist PT                PT Recommendation and Plan  Planned Therapy Interventions: bed mobility training, balance training, home exercise program, strengthening, transfer training, gait training  PT Frequency: other (see comments) (5-14 x wk)  Plan of Care Review  Plan Of Care Reviewed With: patient  Outcome Summary/Follow up Plan: pt admitted 7.1.17 for sepsis due to UTI; she has limited mobility and ex ilya wth a resting  progressing to 115 w/ EOB: will need  graduated ex program to progres her to indep function  as tolerated.          IP PT Goals       07/03/17 1657          Bed Mobility PT LTG    Bed Mobility PT LTG, Date Established 07/03/17  -      Bed Mobility PT LTG, Time to Achieve by discharge  -GB      Bed Mobility PT LTG, Activity Type all bed mobility  -GB      Bed Mobility PT LTG, [place] Level conditional independence  -GB      Bed Mobility PT LTG, Outcome goal not met  -GB      Transfer Training PT LTG    Transfer Training PT LTG, Date Established 07/03/17  -      Transfer Training  PT LTG, Activity Type bed to chair /chair to bed;sit to stand/stand to sit  -GB      Transfer Training PT LTG, [place] Level conditional independence  -GB      Transfer Training PT LTG, Assist Device walker, rolling  -GB      Transfer Training PT LTG, Outcome goal not met  -GB      Gait Training PT LTG    Gait Training Goal PT LTG, Date Established 07/03/17  -GB      Gait Training Goal PT LTG, Time to Achieve by discharge  -GB      Gait Training Goal PT LTG, [place] Level conditional independence  -GB      Gait Training Goal PT LTG, Assist Device walker, rolling  -GB      Gait Training Goal PT LTG, Distance to Achieve able to do standing pivot transfer w/ min assist x 1   -GB      Gait Training Goal PT LTG, Outcome goal not met  -GB      Dynamic Sitting Balance PT LTG    Dynamic Sitting Balance PT LTG, Date Established 07/03/17  -GB      Dynamic Sitting Balance PT LTG, Time to Achieve 4 days  -GB      Dynamic Sitting Balance PT LTG, [place] Level conditional independence  -GB      Dynamic Sitting Balance PT LTG, Additional Goal able to sit indep of UE support  -GB      Dynamic Sitting Balance PT LTG, Outcome goal not met  -GB        User Key  (r) = Recorded By, (t) = Taken By, (c) = Cosigned By    Initials Name Provider Type    TREVOR Blake, PT Physical Therapist                Outcome Measures       07/03/17 1700          How much help from another person do you currently need...    Turning from your back to your side while in flat bed without using bedrails? 3  -GB      Moving from lying on back to sitting on the side of a flat bed without bedrails? 3  -GB      Moving to and from a bed to a chair (including a wheelchair)? 2  -GB      Standing up from a chair using your arms (e.g., wheelchair, bedside chair)? 1  -GB      Climbing 3-5 steps with a railing? 1  -GB      To walk in hospital room? 1  -GB      AM-PAC 6 Clicks Score 11  -GB      Functional Assessment    Outcome Measure  Options AM-PAC 6 Clicks Basic Mobility (PT)  -GB        User Key  (r) = Recorded By, (t) = Taken By, (c) = Cosigned By    Initials Name Provider Type    TREVOR Blake PT Physical Therapist           Time Calculation:         PT Charges       07/03/17 1704          Time Calculation    Start Time 1545  -GB      Stop Time 1645  -GB      Time Calculation (min) 60 min  -GB      PT Received On 07/03/17  -GB      PT Goal Re-Cert Due Date 07/12/17  -GB        User Key  (r) = Recorded By, (t) = Taken By, (c) = Cosigned By    Initials Name Provider Type    TREVOR Blake PT Physical Therapist          Therapy Charges for Today     Code Description Service Date Service Provider Modifiers Qty    95607700572 HC PT MOBILITY CURRENT 7/3/2017 Irina Blake, PT GP, CL 1    98159313057 HC PT MOBILITY PROJECTED 7/3/2017 Irina Blake, PT GP, CI 1    27543513873 HC PT EVAL MOD COMPLEXITY 4 7/3/2017 Irina Blake, PT GP 1          PT G-Codes  PT Professional Judgement Used?: Yes  Outcome Measure Options: AM-PAC 6 Clicks Basic Mobility (PT)  Score: 11  Functional Limitation: Mobility: Walking and moving around  Mobility: Walking and Moving Around Current Status (): At least 60 percent but less than 80 percent impaired, limited or restricted  Mobility: Walking and Moving Around Goal Status (): At least 1 percent but less than 20 percent impaired, limited or restricted      Irina Blake PT  7/3/2017

## 2017-07-03 NOTE — PLAN OF CARE
Problem: Patient Care Overview (Adult)  Goal: Plan of Care Review  Outcome: Ongoing (interventions implemented as appropriate)  Encourage intake of meals and supplements.    07/03/17 1328   Coping/Psychosocial Response Interventions   Plan Of Care Reviewed With patient   Patient Care Overview   Progress no change   Outcome Evaluation   Outcome Summary/Follow up Plan initial assessment

## 2017-07-03 NOTE — PROGRESS NOTES
"Anticoagulation by Pharmacy - Warfarin    Stephani Bush is a 89 y.o.female  [Ht: 62\" (157.5 cm); Wt: 100 lb 6.4 oz (45.5 kg)] on Warfarin 5 mg PO  for indication of Pulmonary Embolism.    Goal INR: 2-3  Today's INR:   Lab Results   Component Value Date    INR 1.32 (H) 07/03/2017         Lab Results   Component Value Date    INR 1.32 (H) 07/03/2017    INR 1.02 07/02/2017    INR 1.00 07/02/2017    PROTIME 16.4 (H) 07/03/2017    PROTIME 13.3 07/02/2017    PROTIME 13.1 07/02/2017     Lab Results   Component Value Date    HGB 9.7 (L) 07/02/2017    HGB 12.0 07/01/2017    HGB 11.3 (L) 04/16/2017     Lab Results   Component Value Date    HCT 29.6 (L) 07/02/2017    HCT 38.0 07/01/2017    HCT 33.5 (L) 04/16/2017     Assessment/Plan:  Reviewed above labs. INR is 1.32.  INR is increasing toward goal. No changes in medication list. Pt did receive dose of warfarin last night.  Will continue current dose of  5 mg. Rx will continue to follow and adjust dose accordingly.  Today is day 3 of therapy.  .    Sophie Cast [facility]  07/03/17 11:30 AM     "

## 2017-07-03 NOTE — PROGRESS NOTES
[facility] Medicine Services  INPATIENT PROGRESS NOTE     LOS: 2 days   Patient Care Team:  Mary Carmen Robles DO as PCP - General    Chief Complaint:  Pulmonary embolism on right      Subjective     Interval History:     Patient Complaints: Patient seen and examined.  Patient resting comfortably.  Continues to complain of having shortness of air.    History taken from: Patient, chart review and nursing staff.    Review of Systems:    Review of Systems   Constitutional: Positive for fatigue. Negative for fever.   Respiratory: Positive for cough and shortness of breath. Negative for choking and wheezing.    Cardiovascular: Positive for chest pain and leg swelling. Negative for palpitations.   Gastrointestinal: Negative for abdominal distention, abdominal pain, constipation, diarrhea, nausea and vomiting.   Musculoskeletal: Positive for back pain and neck pain.   Skin: Positive for pallor.   Neurological: Positive for dizziness, weakness and light-headedness.   Psychiatric/Behavioral: Negative for agitation, behavioral problems and confusion.   All other systems reviewed and are negative.        Objective     Vital Signs  Temp:  [97 °F (36.1 °C)-99.3 °F (37.4 °C)] 97.4 °F (36.3 °C)  Heart Rate:  [] 119  Resp:  [16-22] 22  BP: (110-142)/(56-70) 124/70    Physical Exam:   Physical Exam   Constitutional: She is oriented to person, place, and time. She appears well-developed and well-nourished. No distress.   HENT:   Head: Normocephalic and atraumatic.   Eyes: EOM are normal. Pupils are equal, round, and reactive to light. No scleral icterus.   Neck: Normal range of motion. Neck supple. No thyromegaly present.   Cardiovascular: Normal rate.    Pulmonary/Chest: Effort normal. No respiratory distress. She has no wheezes. She has rales.   Abdominal: Soft. Bowel sounds are normal. She exhibits no distension. There is no tenderness.   Musculoskeletal: She exhibits edema (LLE ) and  tenderness (LLE).   Neurological: She is alert and oriented to person, place, and time. No cranial nerve deficit.   Skin: Skin is dry. No erythema. There is pallor.   Psychiatric: She has a normal mood and affect. Her behavior is normal.   Vitals reviewed.         Results Review:       Results from last 7 days  Lab Units 07/02/17  0520 07/01/17  1729   SODIUM mmol/L 142 141   POTASSIUM mmol/L 4.3 3.8   CHLORIDE mmol/L 110 109   CO2 mmol/L 22.0 21.0*   BUN mg/dL 13 15   CREATININE mg/dL 0.90 0.95   GLUCOSE mg/dL 83 115*   CALCIUM mg/dL 8.3* 8.5   BILIRUBIN mg/dL  --  0.3   ALK PHOS U/L  --  69   ALT (SGPT) U/L  --  31   AST (SGOT) U/L  --  26               Results from last 7 days  Lab Units 07/02/17  0520 07/01/17  1613   WBC 10*3/mm3 7.87 8.50   HEMOGLOBIN g/dL 9.7* 12.0   HEMATOCRIT % 29.6* 38.0   PLATELETS 10*3/mm3 252 250       Lab Results   Component Value Date    CKTOTAL 77 07/01/2017    CKMB 2.35 07/01/2017    TROPONINI 0.017 07/01/2017       CO2   Date Value Ref Range Status   07/02/2017 22.0 22.0 - 31.0 mmol/L Final         Results from last 7 days  Lab Units 07/03/17  0528 07/02/17  1746 07/02/17  1304 07/01/17  1613   INR  1.32* 1.02 1.00 1.07        Imaging Results (last 7 days)     Procedure Component Value Units Date/Time    XR Abdomen 2 View With Chest 1 View [[id number]] Collected:  07/01/17 1733     Updated:  07/01/17 1751    Narrative:         EXAMINATION:  Acute abdominal series              VIEWS:  Chest:  1 ; Abdo:  2   DATE/TIME:     7/1/2017 5:48 PM CDT      INDICATION:  Fatigue, diffuse abdominal pain               COMPARISON EXAMINATION:    none                              FINDINGS:                             Chest:      heart size: within normal limits      mediastinal contour: within normal limits       lungs: no evidence of focal air space disease, grossly  negative      pleura:  no pleural fluid      osseous: Diffuse osteopenia, 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       misc:    Abdomen:      bowel gas pattern: nonobstructive      free air:  none visualized      calculi:   none visualized      osseous:  Multilevel vertebral body compression deformities.      misc:  Large amount of stool in the colon                            Impression:       CONCLUSION:        1. No gross evidence of an acute abdominal process.  2. Large amount of stool in the colon, consider  constipation/impaction..                                If pain or symptoms persist beyond reasonable expectations, a  contrast enhanced CT examination is suggested, as is deemed  clinical appropriate.                       Electronically signed by:  DEVENDRA Garner MD  7/1/2017 5:50 PM  CDT Workstation: [id number]    CT Angiogram Chest With Contrast [[id number]] Collected:  07/01/17 1753     Updated:  07/01/17 1822    Narrative:         EXAM:  Computed Tomography with CTA         REGION:  Chest       INDICATION:    dyspnea     - rule out pulmonary embolism       CLINICAL HISTORY:  CORRELATIVE IMAGING:  None                         TECHNIQUE:     - PE / vascular protocol     - reconstructions:  axial, coronal, sagittal, obliques     - computer-generated 3D reconstructions (MIPS) were performed.     - contrast:  intravenous Isovue 370, 56 mL                                 This exam was performed according to the departmental  dose-optimization program which includes automated exposure  control, adjustment of the mA and/or kV according to patient size  and/or use of iterative reconstruction technique.         COMMENTS:    - Pulmonary arterial system:      - Main pulmonary artery trunk:  negative      - Left, right main pulmonary arteries: Nonocclusive thrombus  in the right main      - Lobar arteries: Nonocclusive thrombus in the right lower  lobe , nonocclusive thrombus in the right upper lobe      - Segmental arteries: grossly negative.      - Systemic vascularity (as visualized):        - Aorta:  grossly negative /  normal caliber / no dissection        - roots of great vessels:  grossly negative / normal  caliber        - SVC / IVC:  grossly negative / normal caliber     - Misc (limited visualization):      - pulmonary parenchyma:  Scattered airspace changes in the  right base.      - pleura:  negative      - mediastinal / fred:  negative      - neck, inferior:  grossly wnl      - subdiaphragmatic structures:  grossly negative (limited  evaluation)       - osseous:      - misc:       .        Impression:       CONCLUSION:          1.  Nonocclusive intraluminal thrombus primarily in the right  main pulmonary artery, right lower lobe artery, and right upper  lobe artery            2.  No evidence of pathology associated with the visualized  aorta.           3. Patchy airspace changes in the right base.    Critical result alert:  ------------------------  The results of examination have been discussed with the referring  healthcare provider, Elaine Wesley, with read back,  immediately following interpretation the examination on 7/1/17 at  19:20 hours.                                    Electronically signed by:  DEVENDRA Garner MD  7/1/2017 6:21 PM  CDT Workstation: [id number]    US Venous Doppler Lower Extremity Left (duplex) [[id number]] Collected:  07/02/17 0832     Updated:  07/02/17 0914    Narrative:         PROCEDURE: Left lower extremity venous duplex    COMPARISON: None    HISTORY: Left lower extremity pain and swelling    FINDINGS: Realtime grayscale and color-flow imaging was performed  of the left lower extremity.    There is visualized thrombus in the proximal, mid, and distal  segments of the left femoral vein, and in the left popliteal  vein. The calf vessels are not adequately visualized. The common  femoral vein, greater saphenous vein, and deep femoral vein  appear patent.      Impression:       CONCLUSION:   Positive for DVT in the left femoral and popliteal veins. Patient  has a known pulmonary  embolism.    Critical results discussed with EFRA DUVALL on 7/2/2017  9:10 AM CDT    Electronically signed by:  Brandon Kruger MD  7/2/2017 9:13 AM CDT  Workstation: [id number]                           Urine Culture   Date Value Ref Range Status   07/01/2017 Culture in progress  Preliminary           Medication Review:   Current Facility-Administered Medications   Medication Dose Route Frequency Provider Last Rate Last Dose   • amitriptyline (ELAVIL) tablet 10 mg  10 mg Oral Nightly Efra uDvall MD   10 mg at 07/02/17 2223   • artificial tears ophthalmic ointment   Both Eyes PRN Efra Duvall MD       • aspirin chewable tablet 81 mg  81 mg Oral Daily Efra Duvall MD   81 mg at 07/03/17 0804   • enoxaparin (LOVENOX) syringe 50 mg  1 mg/kg Subcutaneous Q12H Efra Duvall MD   50 mg at 07/03/17 0622   • ferrous sulfate EC tablet 324 mg  324 mg Oral Daily With Breakfast Efra Duvall MD   324 mg at 07/03/17 0804   • hydrALAZINE (APRESOLINE) injection 10 mg  10 mg Intravenous Q6H PRN Efra Duvall MD       • HYDROcodone-acetaminophen (NORCO)  MG per tablet 1 tablet  1 tablet Oral Q6H PRN Efra Duvall MD   1 tablet at 07/03/17 1058   • ipratropium-albuterol (DUO-NEB) nebulizer solution 3 mL  3 mL Nebulization 4x Daily - RT Efra Duvall MD   3 mL at 07/03/17 1016   • levoFLOXacin (LEVAQUIN) 500 mg/100 mL D5W (premix) (LEVAQUIN) 500 mg  500 mg Intravenous Q24H Favio Matt MD   500 mg at 07/02/17 2312   • magic mouthwash with nystatin oral supsension 10 mL  10 mL Swish & Swallow Q4H PRN Efra Duvall MD       • magnesium hydroxide (MILK OF MAGNESIA) suspension 2400 mg/10mL 10 mL  10 mL Oral Daily PRN Efra Duvall MD       • Morphine sulfate (PF) injection 2 mg  2 mg Intravenous Q4H PRN Efra Duvall MD       • ondansetron (ZOFRAN) injection 4 mg  4 mg Intravenous Q6H PRN Christopher Ortega MD       • pantoprazole (PROTONIX) EC tablet 40 mg  40 mg  Oral Daily Rodrigo Duvall MD   40 mg at 07/03/17 0804   • Pharmacy to Dose enoxaparin (LOVENOX)   Does not apply Continuous PRN Rodrigo Duvall MD       • Pharmacy to dose warfarin   Does not apply Continuous PRN Rodrigo Duvall MD       • predniSONE (DELTASONE) tablet 5 mg  5 mg Oral Daily Rodrigo Duvall MD   5 mg at 07/03/17 0804   • senna (SENOKOT) tablet 8.6 mg  1 tablet Oral Nightly Rodrigo Duvall MD   8.6 mg at 07/02/17 2223   • sodium chloride 0.9 % flush 1-10 mL  1-10 mL Intravenous PRN Rodrigo Duvall MD       • sodium chloride 0.9 % flush 10 mL  10 mL Intravenous PRN Aneesh Henry MD       • sodium chloride 0.9 % infusion  50 mL/hr Intravenous Continuous Ron Smith MD 50 mL/hr at 07/03/17 0804 50 mL/hr at 07/03/17 0804   • warfarin (COUMADIN) tablet 5 mg  5 mg Oral Daily Ron Smith MD   5 mg at 07/02/17 1848         Assessment/Plan     Principal Problem:    Pulmonary embolism on right  Active Problems:    Acute deep vein thrombosis (DVT) of popliteal vein of left lower extremity    UTI (urinary tract infection)    Acute deep vein thrombosis (DVT) of left femoral vein          -Patient is fully anticoagulated with Lovenox and Coumadin.  -Awaiting subtherapeutic INR.  -DVT and GI prophylaxis in place.  -Continue oxygen supplementation.  -We'll continue monitoring patient in hospital setting and treat patient as course dictates.          This document has been electronically signed by Christopher Ortega MD on July 3, 2017 11:32 AM        EMR Dragon/Transcription disclaimer:   Much of this encounter note is an electronic transcription/translation of spoken language to printed text. The electronic translation of spoken language may permit erroneous, or at times, nonsensical words or phrases to be inadvertently transcribed; Although I have reviewed the note for such errors, some may still exist.

## 2017-07-03 NOTE — DISCHARGE PLACEMENT REQUEST
"Stephani Bush (89 y.o. Female)     Date of Birth Social Security Number Address Home Phone MRN    07/20/1926  [address] [place] [address]  [place] [address] [phone] [id number]    Restorationism Marital Status          Mandaeism        Admission Date Admission Type Admitting Provider Attending Provider Department, Room/Bed    7/1/17 Emergency Tikiendu, MD Eloina Harding Anthony W, MD [address], 312/1    Discharge Date Discharge Disposition Discharge Destination                      Attending Provider: Rodrigo Duvall MD     Allergies:  Ciprofloxacin, Sulfa Antibiotics, Lincomycin, Other, Penicillins    Isolation:  None   Infection:  None   Code Status:  Conditional    Ht:  62\" (157.5 cm)   Wt:  100 lb 6.4 oz (45.5 kg)    Admission Cmt:  None   Principal Problem:  Pulmonary embolism on right [I26.99]                 Active Insurance as of 7/1/2017     Primary Coverage     Payor Plan Insurance Group Employer/Plan Group    MEDICARE MEDICARE A & B      Payor Plan Address Payor Plan Phone Number Effective From Effective To    [address] [phone] 7/1/1991     [place] [address]       Subscriber Name Subscriber Birth Date Member ID       STEPHANI BUSH 7/20/1926 [id number]           Secondary Coverage     Payor Plan Insurance Group Employer/Plan Group    [employer]ARP MED SUPP AAR HEALTH CARE OPTIONS [id number]     Payor Plan Address Payor Plan Phone Number Effective From Effective To    [facility] [phone] 1/1/2017     [address]       [place] [address]       Subscriber Name Subscriber Birth Date Member ID       STEPHANI BUSH 7/20/1926 [id number]                 Emergency Contacts      (Rel.) Home Phone Work Phone Mobile Phone    Fatuma Acosta (Daughter) [phone]            Insurance Information                MEDICARE/MEDICARE A & B Phone: [phone]    Subscriber: Stephani Bushjan Subscriber#: [id number] "    Group#:  Precert#:         [employer] MED SUPP/AAR HEALTH CARE OPTIONS Phone: [phone]    Subscriber: Stephani Bush Subscriber#: [id number]    Group#: [id number] Precert#:              History & Physical      YAMILEX Yao at 7/1/2017  7:10 PM     Attestation signed by Rodrigo Duvall MD at 7/2/2017 11:59 AM        I personally evaluated and examined the patient in conjunction with Thompson Cervantes PA-C and agree with the assessment, treatment plan, and disposition of the patient as recorded.  Cardiovascular: Normal S1 and S2 with tachycardia.  Lungs: Distant breath sounds in the bases.  Abdomen: Soft nontender bowel sounds are present.  Extremities : Edema left lower extremity.                                     [facility] Medicine Admission      Date of Admission: 7/1/2017      Primary Care Physician: Mary Carmen Robles DO      Chief Complaint:  Left leg swelling and shortness of air    HPI:  This 89-year-old  female reported to the emergency department with complaints of worsening shortness of air.  Patient has a history of left leg swelling and pain for the last 3 months.  Over the past 2-3 days patient has become short of air.  Today the shortness of air became severe enough that she reported to the emergency department.  Patient was diagnosed with a right-sided pulmonary embolism without obstruction.  Ultrasound of the left lower extremity pending.  Patient and family deny any history of bleeding disorders or GI bleeds, however the patient is elderly and has experienced strokes and falls in the past.  This time we will initiate a Lovenox to Coumadin bridge.  Patient denies fever, chills, hematochezia, melena, hematemesis, hematuria.  Denies chest pain.  Endorses shortness of air.  No abdominal pain, though abdominal films show constipation.  Patient does admit to constipation.  No syncope, presyncope, or dizziness.    Concurrent Medical History:  has  a past medical history of Artificial lens present; Astigmatism; Blepharitis; Myopia; Nonexudative age-related macular degeneration; Pain in eye; and Stroke.  Rheumatoid arthritis; history of chronic anemia, not anemic at this time; temporal arteritis; hypertension; fibromyalgia; history of stroke; osteoporosis; suspected TIA in October 2016.    Past Surgical History:  has no past surgical history on file.  Back surgery; appendectomy; partial hysterectomy; cataract surgery.    Family History: family history is not on file.  Family history significant for CVAs, lung cancer, and breast cancer    Social History:  reports that she has never smoked. She does not have any smokeless tobacco history on file. She reports that she does not drink alcohol or use illicit drugs.    Allergies:   Allergies   Allergen Reactions   • Ciprofloxacin    • Sulfa Antibiotics Nausea Only   • Lincomycin Rash   • Other Rash     Ultra Cee   • Penicillins Rash     Review of Systems:  Review of Systems   Constitutional: Positive for fatigue. Negative for chills and fever.   Respiratory: Positive for shortness of breath. Negative for cough, chest tightness and wheezing.    Cardiovascular: Positive for leg swelling. Negative for chest pain and palpitations.   Gastrointestinal: Positive for constipation. Negative for abdominal pain, blood in stool, diarrhea, nausea and vomiting.   Genitourinary: Negative for decreased urine volume, difficulty urinating, dysuria, flank pain, hematuria and urgency.   Musculoskeletal: Positive for back pain.   Neurological: Negative for dizziness, tremors, seizures, syncope, facial asymmetry, speech difficulty, weakness, light-headedness, numbness and headaches.   Psychiatric/Behavioral: Negative for confusion.      Otherwise complete ROS is negative except as mentioned above.    Physical Exam:   Temp:  [97.4 °F (36.3 °C)] 97.4 °F (36.3 °C)  Heart Rate:  [110-117] 110  Resp:  [20] 20  BP: (107-148)/(55-75)  107/55  Physical Exam   Constitutional: She is oriented to person, place, and time. She appears well-developed and well-nourished. No distress.   HENT:   Head: Normocephalic and atraumatic.   Mouth/Throat: Oropharynx is clear and moist. No oropharyngeal exudate.   Eyes: Conjunctivae and EOM are normal. Pupils are equal, round, and reactive to light. Right eye exhibits no discharge. Left eye exhibits no discharge.   Cardiovascular: Regular rhythm.    Tachycardia   Pulmonary/Chest: Effort normal. No respiratory distress.   Decreased breath sounds   Abdominal: Soft. Bowel sounds are normal. She exhibits no distension. There is no tenderness.   Musculoskeletal: She exhibits edema.   LLE edema, pedal edema  Distal pulses intact  Capillary refill intact  Sensation intact   Neurological: She is alert and oriented to person, place, and time.   Skin: Skin is warm and dry. She is not diaphoretic.   Psychiatric: She has a normal mood and affect. Her behavior is normal.       Results Reviewed:  I have personally reviewed current lab, radiology, and data and agree with results.  Lab Results (last 24 hours)     Procedure Component Value Units Date/Time    CBC Auto Differential [[id number]]  (Abnormal) Collected:  07/01/17 1613    Specimen:  Blood Updated:  07/01/17 1630     WBC 8.50 10*3/mm3      RBC 3.87 10*6/mm3      Hemoglobin 12.0 g/dL      Hematocrit 38.0 %      MCV 98.2 (H) fL      MCH 31.0 pg      MCHC 31.6 g/dL      RDW 13.8 %      RDW-SD 49.3 (H) fl      MPV 9.7 fL      Platelets 250 10*3/mm3      Neutrophil % 88.8 (H) %      Lymphocyte % 5.9 (L) %      Monocyte % 4.6 %      Eosinophil % 0.0 %      Basophil % 0.2 %      Immature Grans % 0.5 %      Neutrophils, Absolute 7.55 10*3/mm3      Lymphocytes, Absolute 0.50 (L) 10*3/mm3      Monocytes, Absolute 0.39 10*3/mm3      Eosinophils, Absolute 0.00 10*3/mm3      Basophils, Absolute 0.02 10*3/mm3      Immature Grans, Absolute 0.04 (H) 10*3/mm3      nRBC 0.0 /100 WBC     CK  [[id number]]  (Normal) Collected:  07/01/17 1613    Specimen:  Blood Updated:  07/01/17 1637     Creatine Kinase 77 U/L     Lactic Acid, Plasma [[id number]]  (Abnormal) Collected:  07/01/17 1613    Specimen:  Blood Updated:  07/01/17 1639     Lactate 2.2 (C) mmol/L     Protime-INR [[id number]]  (Normal) Collected:  07/01/17 1613    Specimen:  Blood Updated:  07/01/17 1645     Protime 13.8 Seconds      INR 1.07    Narrative:       Therapeutic range for most indications is 2.0-3.0 INR,  or 2.5-3.5 for mechanical heart valves.    CK-MB [[id number]]  (Normal) Collected:  07/01/17 1613    Specimen:  Blood Updated:  07/01/17 1646     CKMB 2.35 ng/mL     CBC & Differential [[id number]] Collected:  07/01/17 1613    Specimen:  Blood Updated:  07/01/17 1647    Narrative:       The following orders were created for panel order CBC & Differential.  Procedure                               Abnormality         Status                     ---------                               -----------         ------                     Scan Slide[[id number]]                                       Final result               CBC Auto Differential[[id number]]        Abnormal            Final result                 Please view results for these tests on the individual orders.    Scan Slide [[id number]] Collected:  07/01/17 1613    Specimen:  Blood Updated:  07/01/17 1647     RBC Morphology Normal     WBC Morphology Normal     Platelet Estimate Adequate     Clumped Platelets --      NONE SEEN       D-dimer, Quantitative [[id number]]  (Abnormal) Collected:  07/01/17 1613    Specimen:  Blood Updated:  07/01/17 1657     D-Dimer, Quantitative >4000 (H) ng/mL (FEU)     Narrative:       Dimer values <500 ng/ml FEU are FDA approved as aid in diagnosis of deep venous thrombosis and pulmonary embolism.  This test should not be used in an exclusion strategy with pretest probability alone.    A recent guideline regarding diagnosis for pulmonary thomboembolism recommends  an adjusted exclusion criterion of age x 10 ng/ml FEU for patients >50 years of age ([employer] Intern Med 2015; 163: 701-711).    Troponin [[id number]]  (Normal) Collected:  07/01/17 1646    Specimen:  Blood Updated:  07/01/17 1715     Troponin I 0.017 ng/mL     TSH [[id number]]  (Normal) Collected:  07/01/17 1646    Specimen:  Blood Updated:  07/01/17 1735     TSH 1.210 mIU/mL     Urine Culture [[id number]] Collected:  07/01/17 1713    Specimen:  Urine from Urine, Clean Catch Updated:  07/01/17 1735    Urinalysis With / Culture If Indicated [[id number]]  (Abnormal) Collected:  07/01/17 1713    Specimen:  Urine from Urine, Clean Catch Updated:  07/01/17 1736     Color, UA Yellow     Appearance, UA Clear     pH, UA 7.0     Specific Gravity, UA 1.015     Glucose, UA Negative     Ketones, UA Negative     Bilirubin, UA Negative     Blood, UA Trace (A)     Protein, UA Negative     Leuk Esterase, UA Small (1+) (A)     Nitrite, UA Negative     Urobilinogen, UA 1.0 E.U./dL    Urinalysis, Microscopic Only [[id number]]  (Abnormal) Collected:  07/01/17 1713    Specimen:  Urine from Urine, Clean Catch Updated:  07/01/17 1749     RBC, UA 0-2 (A) /HPF      WBC, UA Too Numerous to Count (A) /HPF      Bacteria, UA 4+ (A) /HPF      Squamous Epithelial Cells, UA 6-12 (A) /HPF      Hyaline Casts, UA None Seen /LPF      Mucus, UA Trace /HPF      Methodology Manual Light Microscopy    Comprehensive Metabolic Panel [[id number]]  (Abnormal) Collected:  07/01/17 1729    Specimen:  Blood from Arm, Left Updated:  07/01/17 1752     Glucose 115 (H) mg/dL      BUN 15 mg/dL      Creatinine 0.95 mg/dL      Sodium 141 mmol/L      Potassium 3.8 mmol/L      Chloride 109 mmol/L      CO2 21.0 (L) mmol/L      Calcium 8.5 mg/dL      Total Protein 5.6 (L) g/dL      Albumin 3.10 (L) g/dL      ALT (SGPT) 31 U/L      AST (SGOT) 26 U/L      Alkaline Phosphatase 69 U/L      Total Bilirubin 0.3 mg/dL      eGFR Non African Amer 55 mL/min/1.73      Globulin 2.5 gm/dL       A/G Ratio 1.2 g/dL      BUN/Creatinine Ratio 15.8     Anion Gap 11.0 mmol/L     Narrative:       The MDRD GFR formula is only valid for adults with stable renal function between ages 18 and 70.    Gold Top - SST [[id number]] Collected:  07/01/17 1613    Specimen:  Blood Updated:  07/01/17 1801     Extra Tube Hold for add-ons.      Auto resulted.       [place] Draw [[id number]] Collected:  07/01/17 1613    Specimen:  Blood Updated:  07/01/17 1801    Narrative:       The following orders were created for panel order [place] Draw.  Procedure                               Abnormality         Status                     ---------                               -----------         ------                     Light Blue Top[[id number]]                                   Final result               Green Top (Gel)[[id number]]                                  Final result               Lavender Top[[id number]]                                     Final result               Gold Top - SST[[id number]]                                   Final result                 Please view results for these tests on the individual orders.    Light Blue Top [[id number]] Collected:  07/01/17 1613    Specimen:  Blood Updated:  07/01/17 1801     Extra Tube hold for add-on      Auto resulted       Green Top (Gel) [[id number]] Collected:  07/01/17 1613    Specimen:  Blood Updated:  07/01/17 1801     Extra Tube Hold for add-ons.      Auto resulted.       Lavender Top [[id number]] Collected:  07/01/17 1613    Specimen:  Blood Updated:  07/01/17 1801     Extra Tube hold for add-on      Auto resulted           Imaging Results (last 24 hours)     Procedure Component Value Units Date/Time    XR Abdomen 2 View With Chest 1 View [[id number]] Collected:  07/01/17 1733     Updated:  07/01/17 1751    Narrative:         EXAMINATION:  Acute abdominal series              VIEWS:  Chest:  1 ; Abdo:  2   DATE/TIME:     7/1/2017 5:48 PM CDT      INDICATION:  Fatigue, diffuse  abdominal pain               COMPARISON EXAMINATION:    none                              FINDINGS:                             Chest:      heart size: within normal limits      mediastinal contour: within normal limits       lungs: no evidence of focal air space disease, grossly  negative      pleura:  no pleural fluid      osseous: Diffuse osteopenia, 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      misc:    Abdomen:      bowel gas pattern: nonobstructive      free air:  none visualized      calculi:   none visualized      osseous:  Multilevel vertebral body compression deformities.      misc:  Large amount of stool in the colon                            Impression:       CONCLUSION:        1. No gross evidence of an acute abdominal process.  2. Large amount of stool in the colon, consider  constipation/impaction..                                If pain or symptoms persist beyond reasonable expectations, a  contrast enhanced CT examination is suggested, as is deemed  clinical appropriate.                       Electronically signed by:  DEVENDRA Garner MD  7/1/2017 5:50 PM  CDT Workstation: [id number]    CT Angiogram Chest With Contrast [[id number]] Collected:  07/01/17 1753     Updated:  07/01/17 1822    Narrative:         EXAM:  Computed Tomography with CTA         REGION:  Chest       INDICATION:    dyspnea     - rule out pulmonary embolism       CLINICAL HISTORY:  CORRELATIVE IMAGING:  None                         TECHNIQUE:     - PE / vascular protocol     - reconstructions:  axial, coronal, sagittal, obliques     - computer-generated 3D reconstructions (MIPS) were performed.     - contrast:  intravenous Isovue 370, 56 mL                                 This exam was performed according to the departmental  dose-optimization program which includes automated exposure  control, adjustment of the mA and/or kV according to patient size  and/or use of iterative reconstruction technique.          COMMENTS:    - Pulmonary arterial system:      - Main pulmonary artery trunk:  negative      - Left, right main pulmonary arteries: Nonocclusive thrombus  in the right main      - Lobar arteries: Nonocclusive thrombus in the right lower  lobe , nonocclusive thrombus in the right upper lobe      - Segmental arteries: grossly negative.      - Systemic vascularity (as visualized):        - Aorta:  grossly negative / normal caliber / no dissection        - roots of great vessels:  grossly negative / normal  caliber        - SVC / IVC:  grossly negative / normal caliber     - Misc (limited visualization):      - pulmonary parenchyma:  Scattered airspace changes in the  right base.      - pleura:  negative      - mediastinal / fred:  negative      - neck, inferior:  grossly wnl      - subdiaphragmatic structures:  grossly negative (limited  evaluation)       - osseous:      - misc:       .        Impression:       CONCLUSION:          1.  Nonocclusive intraluminal thrombus primarily in the right  main pulmonary artery, right lower lobe artery, and right upper  lobe artery            2.  No evidence of pathology associated with the visualized  aorta.           3. Patchy airspace changes in the right base.    Critical result alert:  ------------------------  The results of examination have been discussed with the referring  healthcare provider, Elaine Wesley, with read back,  immediately following interpretation the examination on 7/1/17 at  19:20 hours.                                    Electronically signed by:  DEVENDRA Garner MD  7/1/2017 6:21 PM  CDT Workstation: [id number]            Assessment:    Hospital Problem List     Pulmonary embolism without acute cor pulmonale              Plan:  Lovenox to Coumadin bridge; ultrasound of the left lower extremity to investigate for DVT.  Though patient technically meet sepsis criteria, her tachycardia is secondary to pulmonary embolism.  Urine only shows trace leuk  esterase and has multiple swan the cells, possible contaminant.  Patient will be placed on ceftriaxone and azithromycin given the possible patchy opacity found on radiographical films.  Case management consulted as family and patient would like nursing home placement.  We will recheck lactic acid, a.m. labs, continue to treat as hospital course dictates.          This document has been electronically signed by YAMILEX Yao on July 1, 2017 7:19 PM                   Electronically signed by Rodrigo Duvall MD at 7/2/2017 11:59 AM        Hospital Medications (active)       Dose Frequency Start End    amitriptyline (ELAVIL) tablet 10 mg 10 mg Nightly 7/1/2017     Sig - Route: Take 1 tablet by mouth Every Night. - Oral    artificial tears ophthalmic ointment  As Needed 7/1/2017     Sig - Route: Administer  to both eyes As Needed (Dry eyes or discomfort). - Both Eyes    aspirin chewable tablet 81 mg 81 mg Daily 7/2/2017     Sig - Route: Chew 1 tablet Daily. - Oral    enoxaparin (LOVENOX) syringe 50 mg 1 mg/kg × 45.5 kg Every 12 Hours 7/2/2017     Sig - Route: Inject 0.5 mL under the skin Every 12 (Twelve) Hours. - Subcutaneous    ferrous sulfate EC tablet 324 mg 324 mg Daily With Breakfast 7/2/2017     Sig - Route: Take 1 tablet by mouth Daily With Breakfast. - Oral    hydrALAZINE (APRESOLINE) injection 10 mg 10 mg Every 6 Hours PRN 7/1/2017     Sig - Route: Infuse 0.5 mL into a venous catheter Every 6 (Six) Hours As Needed for High Blood Pressure. - Intravenous    HYDROcodone-acetaminophen (NORCO)  MG per tablet 1 tablet 1 tablet Every 6 Hours PRN 7/1/2017     Sig - Route: Take 1 tablet by mouth Every 6 (Six) Hours As Needed for Moderate Pain (4-6). - Oral    ipratropium-albuterol (DUO-NEB) nebulizer solution 3 mL 3 mL 4 Times Daily - RT 7/1/2017     Sig - Route: Take 3 mL by nebulization 4 (Four) Times a Day. - Nebulization    levoFLOXacin (LEVAQUIN) 500 mg/100 mL D5W (premix) (LEVAQUIN) 500 mg 500 mg  Every 24 Hours 7/1/2017     Sig - Route: Infuse 100 mL into a venous catheter Daily. - Intravenous    magic mouthwash with nystatin oral supsension 10 mL 10 mL Every 4 Hours PRN 7/1/2017     Sig - Route: Swish and swallow 10 mL Every 4 (Four) Hours As Needed for Mouth Pain. - Swish & Swallow    magnesium hydroxide (MILK OF MAGNESIA) suspension 2400 mg/10mL 10 mL 10 mL Daily PRN 7/1/2017     Sig - Route: Take 10 mL by mouth Daily As Needed for Constipation. - Oral    Morphine sulfate (PF) injection 2 mg 2 mg Every 4 Hours PRN 7/1/2017 7/11/2017    Sig - Route: Infuse 1 mL into a venous catheter Every 4 (Four) Hours As Needed for Severe Pain (7-10). - Intravenous    pantoprazole (PROTONIX) EC tablet 40 mg 40 mg Daily 7/2/2017     Sig - Route: Take 1 tablet by mouth Daily. - Oral    Pharmacy to Dose enoxaparin (LOVENOX)  Continuous PRN 7/1/2017     Sig - Route: Continuous As Needed for Consult (Treatment of PE, lovenox to coumadin bridge). - Does not apply    Pharmacy to dose warfarin  Continuous PRN 7/1/2017     Sig - Route: Continuous As Needed for Consult (Dose for PE treatment). - Does not apply    Pharmacy to dose warfarin  Continuous PRN 7/2/2017     Sig - Route: Continuous As Needed for Consult (to keep INR 2.5 - 3.5). - Does not apply    predniSONE (DELTASONE) tablet 5 mg 5 mg Daily 7/2/2017     Sig - Route: Take 1 tablet by mouth Daily. - Oral    senna (SENOKOT) tablet 8.6 mg 1 tablet Nightly 7/1/2017     Sig - Route: Take 1 tablet by mouth Every Night. - Oral    sodium chloride 0.9 % flush 1-10 mL 1-10 mL As Needed 7/1/2017     Sig - Route: Infuse 1-10 mL into a venous catheter As Needed for Line Care. - Intravenous    sodium chloride 0.9 % flush 10 mL 10 mL As Needed 7/1/2017     Sig - Route: Infuse 10 mL into a venous catheter As Needed for Line Care. - Intravenous    Cosign for Ordering: Accepted by Aneesh Henry MD on 7/1/2017  4:29 PM    sodium chloride 0.9 % infusion 50 mL/hr Continuous 7/1/2017     Sig  - Route: Infuse 50 mL/hr into a venous catheter Continuous. - Intravenous    warfarin (COUMADIN) tablet 5 mg 5 mg Daily Warfarin 7/2/2017     Sig - Route: Take 1 tablet by mouth Daily. - Oral    AZITHROMYCIN 500 MG/250 ML 0.9% NS IVPB (vial-mate) (Discontinued) 500 mg Every 24 Hours 7/1/2017 7/2/2017    Sig - Route: Infuse 500 mg into a venous catheter Daily. - Intravenous    warfarin (COUMADIN) tablet 2.5 mg (Discontinued) 2.5 mg Daily Warfarin 7/2/2017 7/2/2017    Sig - Route: Take 1 tablet by mouth Daily. - Oral

## 2017-07-03 NOTE — PLAN OF CARE
Problem: Patient Care Overview (Adult)  Goal: Plan of Care Review  Outcome: Ongoing (interventions implemented as appropriate)    07/03/17 1657   Coping/Psychosocial Response Interventions   Plan Of Care Reviewed With patient   Outcome Evaluation   Outcome Summary/Follow up Plan pt admitted 7.1.17 for sepsis due to UTI; she has limited mobility and ex ilya wth a resting  progressing to 115 w/ EOB: will need graduated ex program to progress her to tolerance and motor function to attempt increased indep mobility/self care as her goals;       Goal: Adult Individualization and Mutuality  Outcome: Ongoing (interventions implemented as appropriate)    07/03/17 1657   Individualization   Patient Specific Goals wants to go home w/ family       Goal: Discharge Needs Assessment  Outcome: Ongoing (interventions implemented as appropriate)    Problem: Inpatient Physical Therapy  Goal: Bed Mobility Goal LTG- PT  Outcome: Ongoing (interventions implemented as appropriate)    07/03/17 1657   Bed Mobility PT LTG   Bed Mobility PT LTG, Date Established 07/03/17   Bed Mobility PT LTG, Time to Achieve by discharge   Bed Mobility PT LTG, Activity Type all bed mobility   Bed Mobility PT LTG, [place] Level conditional independence   Bed Mobility PT LTG, Outcome goal not met       Goal: Transfer Training Goal 1 LTG- PT  Outcome: Ongoing (interventions implemented as appropriate)    07/03/17 1657   Transfer Training PT LTG   Transfer Training PT LTG, Date Established 07/03/17   Transfer Training PT LTG, Activity Type bed to chair /chair to bed;sit to stand/stand to sit   Transfer Training PT LTG, [place] Level conditional independence   Transfer Training PT LTG, Assist Device walker, rolling   Transfer Training PT LTG, Outcome goal not met       Goal: Gait Training Goal LTG- PT  Outcome: Ongoing (interventions implemented as appropriate)    07/03/17 1657   Gait Training PT LTG   Gait Training Goal PT LTG, Date Established  07/03/17   Gait Training Goal PT LTG, Time to Achieve by discharge   Gait Training Goal PT LTG, [place] Level conditional independence   Gait Training Goal PT LTG, Assist Device walker, rolling   Gait Training Goal PT LTG, Distance to Achieve able to do standing pivot transfer w/ min assist x 1    Gait Training Goal PT LTG, Outcome goal not met       Goal: Dynamic Sitting Balance Goal LTG- PT  Outcome: Ongoing (interventions implemented as appropriate)    07/03/17 1657   Dynamic Sitting Balance PT LTG   Dynamic Sitting Balance PT LTG, Date Established 07/03/17   Dynamic Sitting Balance PT LTG, Time to Achieve 4 days   Dynamic Sitting Balance PT LTG, [place] Level conditional independence   Dynamic Sitting Balance PT LTG, Additional Goal able to sit indep of UE support   Dynamic Sitting Balance PT LTG, Outcome goal not met

## 2017-07-04 NOTE — PROGRESS NOTES
[facility] Medicine Services  INPATIENT PROGRESS NOTE     LOS: 3 days   Patient Care Team:  Mary Carmen Robles DO as PCP - General    Chief Complaint:  Pulmonary embolism on right      Subjective     Interval History:     Patient Complaints: Patient seen and examined.  Patient continues to complain of having shortness of air and weakness.    History taken from: Patient.    Review of Systems:    Review of Systems   Constitutional: Positive for fatigue. Negative for fever.   Respiratory: Positive for cough and shortness of breath. Negative for choking and wheezing.    Cardiovascular: Positive for chest pain and leg swelling. Negative for palpitations.   Gastrointestinal: Negative for abdominal distention, abdominal pain, constipation, diarrhea, nausea and vomiting.   Musculoskeletal: Positive for back pain and neck pain.   Skin: Positive for pallor.   Neurological: Positive for dizziness, weakness and light-headedness.   Psychiatric/Behavioral: Negative for agitation, behavioral problems and confusion.   All other systems reviewed and are negative.        Objective     Vital Signs  Temp:  [97.3 °F (36.3 °C)-97.8 °F (36.6 °C)] 97.3 °F (36.3 °C)  Heart Rate:  [] 98  Resp:  [16-22] 20  BP: (107-124)/(53-70) 123/60    Physical Exam:   Physical Exam   Constitutional: She is oriented to person, place, and time. She appears well-developed and well-nourished. No distress.   HENT:   Head: Normocephalic and atraumatic.   Eyes: EOM are normal. Pupils are equal, round, and reactive to light. No scleral icterus.   Neck: Normal range of motion. Neck supple. No thyromegaly present.   Cardiovascular: Normal rate.    Pulmonary/Chest: Effort normal. No respiratory distress. She has no wheezes. She has rales.   Abdominal: Soft. Bowel sounds are normal. She exhibits no distension. There is no tenderness.   Musculoskeletal: She exhibits edema (LLE ) and tenderness (LLE).   Neurological: She is  alert and oriented to person, place, and time. No cranial nerve deficit.   Skin: Skin is dry. No erythema. There is pallor.   Psychiatric: She has a normal mood and affect. Her behavior is normal.   Vitals reviewed.         Results Review:       Results from last 7 days  Lab Units 07/04/17  0610 07/02/17  0520 07/01/17  1729   SODIUM mmol/L 138 142 141   POTASSIUM mmol/L 4.6 4.3 3.8   CHLORIDE mmol/L 107 110 109   CO2 mmol/L 23.0 22.0 21.0*   BUN mg/dL 7 13 15   CREATININE mg/dL 0.79 0.90 0.95   GLUCOSE mg/dL 81 83 115*   CALCIUM mg/dL 8.2* 8.3* 8.5   BILIRUBIN mg/dL 0.2  --  0.3   ALK PHOS U/L 55  --  69   ALT (SGPT) U/L 25  --  31   AST (SGOT) U/L 30  --  26         Results from last 7 days  Lab Units 07/04/17  0610   MAGNESIUM mg/dL 1.4*         Results from last 7 days  Lab Units 07/04/17  0610 07/02/17  0520 07/01/17  1613   WBC 10*3/mm3 7.79 7.87 8.50   HEMOGLOBIN g/dL 9.6* 9.7* 12.0   HEMATOCRIT % 29.4* 29.6* 38.0   PLATELETS 10*3/mm3 295 252 250       Lab Results   Component Value Date    CKTOTAL 77 07/01/2017    CKMB 2.35 07/01/2017    TROPONINI 0.017 07/01/2017       CO2   Date Value Ref Range Status   07/04/2017 23.0 22.0 - 31.0 mmol/L Final         Results from last 7 days  Lab Units 07/04/17  0610 07/03/17  0528 07/02/17  1746 07/02/17  1304 07/01/17  1613   INR  2.21* 1.32* 1.02 1.00 1.07        Imaging Results (last 7 days)     Procedure Component Value Units Date/Time    XR Abdomen 2 View With Chest 1 View [[id number]] Collected:  07/01/17 1733     Updated:  07/01/17 1751    Narrative:         EXAMINATION:  Acute abdominal series              VIEWS:  Chest:  1 ; Abdo:  2   DATE/TIME:     7/1/2017 5:48 PM CDT      INDICATION:  Fatigue, diffuse abdominal pain               COMPARISON EXAMINATION:    none                              FINDINGS:                             Chest:      heart size: within normal limits      mediastinal contour: within normal limits       lungs: no evidence of focal air space  disease, grossly  negative      pleura:  no pleural fluid      osseous: Diffuse osteopenia, 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      misc:    Abdomen:      bowel gas pattern: nonobstructive      free air:  none visualized      calculi:   none visualized      osseous:  Multilevel vertebral body compression deformities.      misc:  Large amount of stool in the colon                            Impression:       CONCLUSION:        1. No gross evidence of an acute abdominal process.  2. Large amount of stool in the colon, consider  constipation/impaction..                                If pain or symptoms persist beyond reasonable expectations, a  contrast enhanced CT examination is suggested, as is deemed  clinical appropriate.                       Electronically signed by:  DEVENDRA Garner MD  7/1/2017 5:50 PM  CDT Workstation: [id number]    CT Angiogram Chest With Contrast [[id number]] Collected:  07/01/17 1753     Updated:  07/01/17 1822    Narrative:         EXAM:  Computed Tomography with CTA         REGION:  Chest       INDICATION:    dyspnea     - rule out pulmonary embolism       CLINICAL HISTORY:  CORRELATIVE IMAGING:  None                         TECHNIQUE:     - PE / vascular protocol     - reconstructions:  axial, coronal, sagittal, obliques     - computer-generated 3D reconstructions (MIPS) were performed.     - contrast:  intravenous Isovue 370, 56 mL                                 This exam was performed according to the departmental  dose-optimization program which includes automated exposure  control, adjustment of the mA and/or kV according to patient size  and/or use of iterative reconstruction technique.         COMMENTS:    - Pulmonary arterial system:      - Main pulmonary artery trunk:  negative      - Left, right main pulmonary arteries: Nonocclusive thrombus  in the right main      - Lobar arteries: Nonocclusive thrombus in the right lower  lobe , nonocclusive thrombus in the  right upper lobe      - Segmental arteries: grossly negative.      - Systemic vascularity (as visualized):        - Aorta:  grossly negative / normal caliber / no dissection        - roots of great vessels:  grossly negative / normal  caliber        - SVC / IVC:  grossly negative / normal caliber     - Misc (limited visualization):      - pulmonary parenchyma:  Scattered airspace changes in the  right base.      - pleura:  negative      - mediastinal / fred:  negative      - neck, inferior:  grossly wnl      - subdiaphragmatic structures:  grossly negative (limited  evaluation)       - osseous:      - misc:       .        Impression:       CONCLUSION:          1.  Nonocclusive intraluminal thrombus primarily in the right  main pulmonary artery, right lower lobe artery, and right upper  lobe artery            2.  No evidence of pathology associated with the visualized  aorta.           3. Patchy airspace changes in the right base.    Critical result alert:  ------------------------  The results of examination have been discussed with the referring  healthcare provider, Elaine Wesley, with read back,  immediately following interpretation the examination on 7/1/17 at  19:20 hours.                                    Electronically signed by:  DEVENDRA Garner MD  7/1/2017 6:21 PM  CDT Workstation: [id number]    US Venous Doppler Lower Extremity Left (duplex) [[id number]] Collected:  07/02/17 0832     Updated:  07/02/17 0914    Narrative:         PROCEDURE: Left lower extremity venous duplex    COMPARISON: None    HISTORY: Left lower extremity pain and swelling    FINDINGS: Realtime grayscale and color-flow imaging was performed  of the left lower extremity.    There is visualized thrombus in the proximal, mid, and distal  segments of the left femoral vein, and in the left popliteal  vein. The calf vessels are not adequately visualized. The common  femoral vein, greater saphenous vein, and deep femoral vein  appear  patent.      Impression:       CONCLUSION:   Positive for DVT in the left femoral and popliteal veins. Patient  has a known pulmonary embolism.    Critical results discussed with EFRA DUVALL on 7/2/2017  9:10 AM CDT    Electronically signed by:  Brandon Kruger MD  7/2/2017 9:13 AM CDT  Workstation: [id number]                           Urine Culture   Date Value Ref Range Status   07/01/2017 Culture in progress  Final   07/01/2017 Normal Urogenital Kaila  Final           Medication Review:   Current Facility-Administered Medications   Medication Dose Route Frequency Provider Last Rate Last Dose   • amitriptyline (ELAVIL) tablet 10 mg  10 mg Oral Nightly Efra Duvall MD   10 mg at 07/03/17 2011   • artificial tears ophthalmic ointment   Both Eyes PRN Efra Duvall MD       • aspirin chewable tablet 81 mg  81 mg Oral Daily Efra Duvall MD   81 mg at 07/04/17 0802   • enoxaparin (LOVENOX) syringe 50 mg  1 mg/kg Subcutaneous Q12H Efra Duvall MD   50 mg at 07/04/17 0615   • ferrous sulfate EC tablet 324 mg  324 mg Oral Daily With Breakfast Efra Duvall MD   324 mg at 07/04/17 0802   • hydrALAZINE (APRESOLINE) injection 10 mg  10 mg Intravenous Q6H PRN Efra Duvall MD       • HYDROcodone-acetaminophen (NORCO)  MG per tablet 1 tablet  1 tablet Oral Q6H PRN Efra Duvall MD   1 tablet at 07/04/17 0623   • ipratropium-albuterol (DUO-NEB) nebulizer solution 3 mL  3 mL Nebulization 4x Daily - RT Efra Duvall MD   3 mL at 07/04/17 1057   • levoFLOXacin (LEVAQUIN) 500 mg/100 mL D5W (premix) (LEVAQUIN) 500 mg  500 mg Intravenous Q24H Favio Matt MD   500 mg at 07/03/17 2203   • magic mouthwash with nystatin oral supsension 10 mL  10 mL Swish & Swallow Q4H PRN Efra Duvall MD       • magnesium hydroxide (MILK OF MAGNESIA) suspension 2400 mg/10mL 10 mL  10 mL Oral Daily PRN Efra Duvall MD   10 mL at 07/03/17 1151   • megestrol (MEGACE) 40 MG/ML suspension 400  mg  400 mg Oral BID Christopher Ortega MD       • Morphine sulfate (PF) injection 2 mg  2 mg Intravenous Q4H PRN Rodrigo Duvall MD       • ondansetron (ZOFRAN) injection 4 mg  4 mg Intravenous Q6H PRN Christopher Ortega MD       • pantoprazole (PROTONIX) EC tablet 40 mg  40 mg Oral Daily Rodrigo Duvall MD   40 mg at 07/04/17 0802   • Pharmacy to Dose enoxaparin (LOVENOX)   Does not apply Continuous PRN Rodrigo Duvall MD       • Pharmacy to dose warfarin   Does not apply Continuous PRN Rodrigo Duvall MD       • predniSONE (DELTASONE) tablet 5 mg  5 mg Oral Daily Rodrigo Duvall MD   5 mg at 07/04/17 0802   • senna (SENOKOT) tablet 8.6 mg  1 tablet Oral Nightly Rodrigo Duvall MD   8.6 mg at 07/03/17 2011   • sodium chloride 0.9 % flush 1-10 mL  1-10 mL Intravenous PRN Rodrigo Duvall MD       • sodium chloride 0.9 % flush 10 mL  10 mL Intravenous PRN Aneesh Henry MD       • sodium chloride 0.9 % infusion  50 mL/hr Intravenous Continuous Ron Smith MD 50 mL/hr at 07/03/17 2010 50 mL/hr at 07/03/17 2010   • warfarin (COUMADIN) tablet 5 mg  5 mg Oral Daily Ron Smith MD   5 mg at 07/03/17 1728         Assessment/Plan     Principal Problem:    Pulmonary embolism on right  Active Problems:    Acute deep vein thrombosis (DVT) of popliteal vein of left lower extremity    UTI (urinary tract infection)    Acute deep vein thrombosis (DVT) of left femoral vein          -Continue with anticoagulation.  -PTOT to initiated.  -DVT and GI prophylaxis in place.  -Continue other management as prior.  -We'll continue monitoring patient in hospital setting and treat patient as course dictates.          This document has been electronically signed by Christopher Ortega MD on July 4, 2017 11:10 AM        EMR Dragon/Transcription disclaimer:   Much of this encounter note is an electronic transcription/translation of spoken language to printed text. The electronic translation of spoken  language may permit erroneous, or at times, nonsensical words or phrases to be inadvertently transcribed; Although I have reviewed the note for such errors, some may still exist.

## 2017-07-04 NOTE — PROGRESS NOTES
"Anticoagulation by Pharmacy - Warfarin    Stephani Bush is a 89 y.o.female  [Ht: 62.01\" (157.5 cm); Wt: 98 lb 4.8 oz (44.6 kg)] on Warfarin 5 mg PO  for indication of Pulmonary Embolism.    Goal INR: 2-3  Today's INR:   Lab Results   Component Value Date    INR 2.21 (H) 07/04/2017         Lab Results   Component Value Date    INR 2.21 (H) 07/04/2017    INR 1.32 (H) 07/03/2017    INR 1.02 07/02/2017    PROTIME 24.7 (H) 07/04/2017    PROTIME 16.4 (H) 07/03/2017    PROTIME 13.3 07/02/2017     Lab Results   Component Value Date    HGB 9.6 (L) 07/04/2017    HGB 9.7 (L) 07/02/2017    HGB 12.0 07/01/2017     Lab Results   Component Value Date    HCT 29.4 (L) 07/04/2017    HCT 29.6 (L) 07/02/2017    HCT 38.0 07/01/2017     Assessment/Plan:  Reviewed above labs. INR is 2.21.  INR is  therapeutic but level did increase from 1.32. Pt did receive dose of warfarin last night. No changes in medication list. Will decrease current dose of warfarin to  2 mg to try and maintain goal.. Rx will continue to follow and adjust dose accordingly.      Sophie Cast, [facility]  07/04/17 12:41 PM     "

## 2017-07-04 NOTE — PLAN OF CARE
Problem: Patient Care Overview (Adult)  Goal: Plan of Care Review  Outcome: Ongoing (interventions implemented as appropriate)    07/04/17 1541   Coping/Psychosocial Response Interventions   Plan Of Care Reviewed With patient   Patient Care Overview   Progress no change       Goal: Adult Individualization and Mutuality  Outcome: Ongoing (interventions implemented as appropriate)  Goal: Discharge Needs Assessment  Outcome: Ongoing (interventions implemented as appropriate)    Problem: VTE, DVT and PE (Adult)  Goal: Signs and Symptoms of Listed Potential Problems Will be Absent or Manageable (VTE, DVT and PE)  Outcome: Ongoing (interventions implemented as appropriate)

## 2017-07-05 NOTE — DISCHARGE PLACEMENT REQUEST
"Stephani Osullivan (89 y.o. Female)     Date of Birth Social Security Number Address Home Phone MRN    07/20/1926  [address]  [place] [address] [phone] [id number]    [facility] Marital Status          Sabianist        Admission Date Admission Type Admitting Provider Attending Provider Department, Room/Bed    7/1/17 Emergency Rodrigo Duvall MD Echendu, Anthony W, MD [address], 312/1    Discharge Date Discharge Disposition Discharge Destination                      Attending Provider: Rodrigo Duvall MD     Allergies:  Ciprofloxacin, Sulfa Antibiotics, Lincomycin, Other, Penicillins    Isolation:  None   Infection:  None   Code Status:  Conditional    Ht:  62.01\" (157.5 cm)   Wt:  97 lb 9.6 oz (44.3 kg)    Admission Cmt:  None   Principal Problem:  Pulmonary embolism on right [I26.99]                 Active Insurance as of 7/1/2017     Primary Coverage     Payor Plan Insurance Group Employer/Plan Group    MEDICARE MEDICARE A & B      Payor Plan Address Payor Plan Phone Number Effective From Effective To    [address] [phone] 7/1/1991     [place] [address]       Subscriber Name Subscriber Birth Date Member ID       STEPHANI OSULLIVAN 7/20/1926 [id number]           Secondary Coverage     Payor Plan Insurance Group Employer/Plan Group    AARP MED SUPP AAR HEALTH CARE OPTIONS [id number]     Payor Plan Address Payor Plan Phone Number Effective From Effective To    [facility] [phone] 1/1/2017     [address]       [place] [address]       Subscriber Name Subscriber Birth Date Member ID       STEPHANI OSULLIVAN 7/20/1926 [id number]                 Emergency Contacts      (Rel.) Home Phone Work Phone Mobile Phone    Fatuma Acosta (Daughter) [phone]            Emergency Contact Information     Name Relation Home Work Mobile    Fatuma Acosta Daughter [phone]    "       Insurance Information                MEDICARE/MEDICARE A & B Phone: [phone]    Subscriber: Stephani Bush Subscriber#: [id number]    Group#:  Precert#:         AARP MED SUPP/AARP HEALTH CARE OPTIONS Phone: [phone]    Subscriber: Stephani Bush Subscriber#: [id number]    Group#: [id number] Precert#:              History & Physical      YAMILEX Yao at 7/1/2017  7:10 PM     Attestation signed by Rodrigo Duvall MD at 7/2/2017 11:59 AM        I personally evaluated and examined the patient in conjunction with Thompson Cervantes PA-C and agree with the assessment, treatment plan, and disposition of the patient as recorded.  Cardiovascular: Normal S1 and S2 with tachycardia.  Lungs: Distant breath sounds in the bases.  Abdomen: Soft nontender bowel sounds are present.  Extremities : Edema left lower extremity.                                     [facility] Medicine Admission      Date of Admission: 7/1/2017      Primary Care Physician: Mary Carmen Robles DO      Chief Complaint:  Left leg swelling and shortness of air    HPI:  This 89-year-old  female reported to the emergency department with complaints of worsening shortness of air.  Patient has a history of left leg swelling and pain for the last 3 months.  Over the past 2-3 days patient has become short of air.  Today the shortness of air became severe enough that she reported to the emergency department.  Patient was diagnosed with a right-sided pulmonary embolism without obstruction.  Ultrasound of the left lower extremity pending.  Patient and family deny any history of bleeding disorders or GI bleeds, however the patient is elderly and has experienced strokes and falls in the past.  This time we will initiate a Lovenox to Coumadin bridge.  Patient denies fever, chills, hematochezia, melena, hematemesis, hematuria.  Denies chest pain.  Endorses shortness of air.  No abdominal pain, though  abdominal films show constipation.  Patient does admit to constipation.  No syncope, presyncope, or dizziness.    Concurrent Medical History:  has a past medical history of Artificial lens present; Astigmatism; Blepharitis; Myopia; Nonexudative age-related macular degeneration; Pain in eye; and Stroke.  Rheumatoid arthritis; history of chronic anemia, not anemic at this time; temporal arteritis; hypertension; fibromyalgia; history of stroke; osteoporosis; suspected TIA in October 2016.    Past Surgical History:  has no past surgical history on file.  Back surgery; appendectomy; partial hysterectomy; cataract surgery.    Family History: family history is not on file.  Family history significant for CVAs, lung cancer, and breast cancer    Social History:  reports that she has never smoked. She does not have any smokeless tobacco history on file. She reports that she does not drink alcohol or use illicit drugs.    Allergies:   Allergies   Allergen Reactions   • Ciprofloxacin    • Sulfa Antibiotics Nausea Only   • Lincomycin Rash   • Other Rash     Ultra Cee   • Penicillins Rash     Review of Systems:  Review of Systems   Constitutional: Positive for fatigue. Negative for chills and fever.   Respiratory: Positive for shortness of breath. Negative for cough, chest tightness and wheezing.    Cardiovascular: Positive for leg swelling. Negative for chest pain and palpitations.   Gastrointestinal: Positive for constipation. Negative for abdominal pain, blood in stool, diarrhea, nausea and vomiting.   Genitourinary: Negative for decreased urine volume, difficulty urinating, dysuria, flank pain, hematuria and urgency.   Musculoskeletal: Positive for back pain.   Neurological: Negative for dizziness, tremors, seizures, syncope, facial asymmetry, speech difficulty, weakness, light-headedness, numbness and headaches.   Psychiatric/Behavioral: Negative for confusion.      Otherwise complete ROS is negative except as mentioned  above.    Physical Exam:   Temp:  [97.4 °F (36.3 °C)] 97.4 °F (36.3 °C)  Heart Rate:  [110-117] 110  Resp:  [20] 20  BP: (107-148)/(55-75) 107/55  Physical Exam   Constitutional: She is oriented to person, place, and time. She appears well-developed and well-nourished. No distress.   HENT:   Head: Normocephalic and atraumatic.   Mouth/Throat: Oropharynx is clear and moist. No oropharyngeal exudate.   Eyes: Conjunctivae and EOM are normal. Pupils are equal, round, and reactive to light. Right eye exhibits no discharge. Left eye exhibits no discharge.   Cardiovascular: Regular rhythm.    Tachycardia   Pulmonary/Chest: Effort normal. No respiratory distress.   Decreased breath sounds   Abdominal: Soft. Bowel sounds are normal. She exhibits no distension. There is no tenderness.   Musculoskeletal: She exhibits edema.   LLE edema, pedal edema  Distal pulses intact  Capillary refill intact  Sensation intact   Neurological: She is alert and oriented to person, place, and time.   Skin: Skin is warm and dry. She is not diaphoretic.   Psychiatric: She has a normal mood and affect. Her behavior is normal.       Results Reviewed:  I have personally reviewed current lab, radiology, and data and agree with results.  Lab Results (last 24 hours)     Procedure Component Value Units Date/Time    CBC Auto Differential [[id number]]  (Abnormal) Collected:  07/01/17 1613    Specimen:  Blood Updated:  07/01/17 1630     WBC 8.50 10*3/mm3      RBC 3.87 10*6/mm3      Hemoglobin 12.0 g/dL      Hematocrit 38.0 %      MCV 98.2 (H) fL      MCH 31.0 pg      MCHC 31.6 g/dL      RDW 13.8 %      RDW-SD 49.3 (H) fl      MPV 9.7 fL      Platelets 250 10*3/mm3      Neutrophil % 88.8 (H) %      Lymphocyte % 5.9 (L) %      Monocyte % 4.6 %      Eosinophil % 0.0 %      Basophil % 0.2 %      Immature Grans % 0.5 %      Neutrophils, Absolute 7.55 10*3/mm3      Lymphocytes, Absolute 0.50 (L) 10*3/mm3      Monocytes, Absolute 0.39 10*3/mm3      Eosinophils,  Absolute 0.00 10*3/mm3      Basophils, Absolute 0.02 10*3/mm3      Immature Grans, Absolute 0.04 (H) 10*3/mm3      nRBC 0.0 /100 WBC     CK [[id number]]  (Normal) Collected:  07/01/17 1613    Specimen:  Blood Updated:  07/01/17 1637     Creatine Kinase 77 U/L     Lactic Acid, Plasma [[id number]]  (Abnormal) Collected:  07/01/17 1613    Specimen:  Blood Updated:  07/01/17 1639     Lactate 2.2 (C) mmol/L     Protime-INR [[id number]]  (Normal) Collected:  07/01/17 1613    Specimen:  Blood Updated:  07/01/17 1645     Protime 13.8 Seconds      INR 1.07    Narrative:       Therapeutic range for most indications is 2.0-3.0 INR,  or 2.5-3.5 for mechanical heart valves.    CK-MB [[id number]]  (Normal) Collected:  07/01/17 1613    Specimen:  Blood Updated:  07/01/17 1646     CKMB 2.35 ng/mL     CBC & Differential [[id number]] Collected:  07/01/17 1613    Specimen:  Blood Updated:  07/01/17 1647    Narrative:       The following orders were created for panel order CBC & Differential.  Procedure                               Abnormality         Status                     ---------                               -----------         ------                     Scan Slide[[id number]]                                       Final result               CBC Auto Differential[[id number]]        Abnormal            Final result                 Please view results for these tests on the individual orders.    Scan Slide [[id number]] Collected:  07/01/17 1613    Specimen:  Blood Updated:  07/01/17 1647     RBC Morphology Normal     WBC Morphology Normal     Platelet Estimate Adequate     Clumped Platelets --      NONE SEEN       D-dimer, Quantitative [[id number]]  (Abnormal) Collected:  07/01/17 1613    Specimen:  Blood Updated:  07/01/17 1657     D-Dimer, Quantitative >4000 (H) ng/mL (FEU)     Narrative:       Dimer values <500 ng/ml FEU are FDA approved as aid in diagnosis of deep venous thrombosis and pulmonary embolism.  This test should not be  used in an exclusion strategy with pretest probability alone.    A recent guideline regarding diagnosis for pulmonary thomboembolism recommends an adjusted exclusion criterion of age x 10 ng/ml FEU for patients >50 years of age ([employer] Intern Med 2015; 163: 701-711).    Troponin [[id number]]  (Normal) Collected:  07/01/17 1646    Specimen:  Blood Updated:  07/01/17 1715     Troponin I 0.017 ng/mL     TSH [[id number]]  (Normal) Collected:  07/01/17 1646    Specimen:  Blood Updated:  07/01/17 1735     TSH 1.210 mIU/mL     Urine Culture [[id number]] Collected:  07/01/17 1713    Specimen:  Urine from Urine, Clean Catch Updated:  07/01/17 1735    Urinalysis With / Culture If Indicated [[id number]]  (Abnormal) Collected:  07/01/17 1713    Specimen:  Urine from Urine, Clean Catch Updated:  07/01/17 1736     Color, UA Yellow     Appearance, UA Clear     pH, UA 7.0     Specific Gravity, UA 1.015     Glucose, UA Negative     Ketones, UA Negative     Bilirubin, UA Negative     Blood, UA Trace (A)     Protein, UA Negative     Leuk Esterase, UA Small (1+) (A)     Nitrite, UA Negative     Urobilinogen, UA 1.0 E.U./dL    Urinalysis, Microscopic Only [[id number]]  (Abnormal) Collected:  07/01/17 1713    Specimen:  Urine from Urine, Clean Catch Updated:  07/01/17 1749     RBC, UA 0-2 (A) /HPF      WBC, UA Too Numerous to Count (A) /HPF      Bacteria, UA 4+ (A) /HPF      Squamous Epithelial Cells, UA 6-12 (A) /HPF      Hyaline Casts, UA None Seen /LPF      Mucus, UA Trace /HPF      Methodology Manual Light Microscopy    Comprehensive Metabolic Panel [[id number]]  (Abnormal) Collected:  07/01/17 1729    Specimen:  Blood from Arm, Left Updated:  07/01/17 1752     Glucose 115 (H) mg/dL      BUN 15 mg/dL      Creatinine 0.95 mg/dL      Sodium 141 mmol/L      Potassium 3.8 mmol/L      Chloride 109 mmol/L      CO2 21.0 (L) mmol/L      Calcium 8.5 mg/dL      Total Protein 5.6 (L) g/dL      Albumin 3.10 (L) g/dL      ALT (SGPT) 31 U/L      AST (SGOT)  26 U/L      Alkaline Phosphatase 69 U/L      Total Bilirubin 0.3 mg/dL      eGFR Non African Amer 55 mL/min/1.73      Globulin 2.5 gm/dL      A/G Ratio 1.2 g/dL      BUN/Creatinine Ratio 15.8     Anion Gap 11.0 mmol/L     Narrative:       The MDRD GFR formula is only valid for adults with stable renal function between ages 18 and 70.    Gold Top - SST [[id number]] Collected:  07/01/17 1613    Specimen:  Blood Updated:  07/01/17 1801     Extra Tube Hold for add-ons.      Auto resulted.       [place] Draw [[id number]] Collected:  07/01/17 1613    Specimen:  Blood Updated:  07/01/17 1801    Narrative:       The following orders were created for panel order [place] Draw.  Procedure                               Abnormality         Status                     ---------                               -----------         ------                     Light Blue Top[[id number]]                                   Final result               Green Top (Gel)[[id number]]                                  Final result               Lavender Top[[id number]]                                     Final result               Gold Top - SST[[id number]]                                   Final result                 Please view results for these tests on the individual orders.    Light Blue Top [[id number]] Collected:  07/01/17 1613    Specimen:  Blood Updated:  07/01/17 1801     Extra Tube hold for add-on      Auto resulted       Green Top (Gel) [[id number]] Collected:  07/01/17 1613    Specimen:  Blood Updated:  07/01/17 1801     Extra Tube Hold for add-ons.      Auto resulted.       Lavender Top [[id number]] Collected:  07/01/17 1613    Specimen:  Blood Updated:  07/01/17 1801     Extra Tube hold for add-on      Auto resulted           Imaging Results (last 24 hours)     Procedure Component Value Units Date/Time    XR Abdomen 2 View With Chest 1 View [[id number]] Collected:  07/01/17 1733     Updated:  07/01/17 1751    Narrative:         EXAMINATION:   Acute abdominal series              VIEWS:  Chest:  1 ; Abdo:  2   DATE/TIME:     7/1/2017 5:48 PM CDT      INDICATION:  Fatigue, diffuse abdominal pain               COMPARISON EXAMINATION:    none                              FINDINGS:                             Chest:      heart size: within normal limits      mediastinal contour: within normal limits       lungs: no evidence of focal air space disease, grossly  negative      pleura:  no pleural fluid      osseous: Diffuse osteopenia, 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      misc:    Abdomen:      bowel gas pattern: nonobstructive      free air:  none visualized      calculi:   none visualized      osseous:  Multilevel vertebral body compression deformities.      misc:  Large amount of stool in the colon                            Impression:       CONCLUSION:        1. No gross evidence of an acute abdominal process.  2. Large amount of stool in the colon, consider  constipation/impaction..                                If pain or symptoms persist beyond reasonable expectations, a  contrast enhanced CT examination is suggested, as is deemed  clinical appropriate.                       Electronically signed by:  DEVENDRA Garner MD  7/1/2017 5:50 PM  CDT Workstation: [id number]    CT Angiogram Chest With Contrast [[id number]] Collected:  07/01/17 1753     Updated:  07/01/17 1822    Narrative:         EXAM:  Computed Tomography with CTA         REGION:  Chest       INDICATION:    dyspnea     - rule out pulmonary embolism       CLINICAL HISTORY:  CORRELATIVE IMAGING:  None                         TECHNIQUE:     - PE / vascular protocol     - reconstructions:  axial, coronal, sagittal, obliques     - computer-generated 3D reconstructions (MIPS) were performed.     - contrast:  intravenous Isovue 370, 56 mL                                 This exam was performed according to the departmental  dose-optimization program which includes automated  exposure  control, adjustment of the mA and/or kV according to patient size  and/or use of iterative reconstruction technique.         COMMENTS:    - Pulmonary arterial system:      - Main pulmonary artery trunk:  negative      - Left, right main pulmonary arteries: Nonocclusive thrombus  in the right main      - Lobar arteries: Nonocclusive thrombus in the right lower  lobe , nonocclusive thrombus in the right upper lobe      - Segmental arteries: grossly negative.      - Systemic vascularity (as visualized):        - Aorta:  grossly negative / normal caliber / no dissection        - roots of great vessels:  grossly negative / normal  caliber        - SVC / IVC:  grossly negative / normal caliber     - Misc (limited visualization):      - pulmonary parenchyma:  Scattered airspace changes in the  right base.      - pleura:  negative      - mediastinal / fred:  negative      - neck, inferior:  grossly wnl      - subdiaphragmatic structures:  grossly negative (limited  evaluation)       - osseous:      - misc:       .        Impression:       CONCLUSION:          1.  Nonocclusive intraluminal thrombus primarily in the right  main pulmonary artery, right lower lobe artery, and right upper  lobe artery            2.  No evidence of pathology associated with the visualized  aorta.           3. Patchy airspace changes in the right base.    Critical result alert:  ------------------------  The results of examination have been discussed with the referring  healthcare provider, Elaine Wesley, with read back,  immediately following interpretation the examination on 7/1/17 at  19:20 hours.                                    Electronically signed by:  DEVENDRA Garner MD  7/1/2017 6:21 PM  CDT Workstation: [id number]            Assessment:    Hospital Problem List     Pulmonary embolism without acute cor pulmonale              Plan:  Lovenox to Coumadin bridge; ultrasound of the left lower extremity to investigate for  DVT.  Though patient technically meet sepsis criteria, her tachycardia is secondary to pulmonary embolism.  Urine only shows trace leuk esterase and has multiple swan the cells, possible contaminant.  Patient will be placed on ceftriaxone and azithromycin given the possible patchy opacity found on radiographical films.  Case management consulted as family and patient would like nursing home placement.  We will recheck lactic acid, a.m. labs, continue to treat as hospital course dictates.          This document has been electronically signed by YAMILEX Yao on July 1, 2017 7:19 PM                   Electronically signed by Rodrigo Duvall MD at 7/2/2017 11:59 AM        Hospital Medications (active)       Dose Frequency Start End    amitriptyline (ELAVIL) tablet 10 mg 10 mg Nightly 7/1/2017     Sig - Route: Take 1 tablet by mouth Every Night. - Oral    artificial tears ophthalmic ointment  As Needed 7/1/2017     Sig - Route: Administer  to both eyes As Needed (Dry eyes or discomfort). - Both Eyes    aspirin chewable tablet 81 mg 81 mg Daily 7/2/2017     Sig - Route: Chew 1 tablet Daily. - Oral    enoxaparin (LOVENOX) syringe 50 mg 1 mg/kg × 45.5 kg Every 12 Hours 7/2/2017     Sig - Route: Inject 0.5 mL under the skin Every 12 (Twelve) Hours. - Subcutaneous    ferrous sulfate EC tablet 324 mg 324 mg Daily With Breakfast 7/2/2017     Sig - Route: Take 1 tablet by mouth Daily With Breakfast. - Oral    hydrALAZINE (APRESOLINE) injection 10 mg 10 mg Every 6 Hours PRN 7/1/2017     Sig - Route: Infuse 0.5 mL into a venous catheter Every 6 (Six) Hours As Needed for High Blood Pressure. - Intravenous    HYDROcodone-acetaminophen (NORCO)  MG per tablet 1 tablet 1 tablet Every 6 Hours PRN 7/1/2017     Sig - Route: Take 1 tablet by mouth Every 6 (Six) Hours As Needed for Moderate Pain (4-6). - Oral    ipratropium-albuterol (DUO-NEB) nebulizer solution 3 mL 3 mL 4 Times Daily - RT 7/1/2017     Sig - Route: Take 3  mL by nebulization 4 (Four) Times a Day. - Nebulization    levoFLOXacin (LEVAQUIN) 500 mg/100 mL D5W (premix) (LEVAQUIN) 500 mg 500 mg Every 24 Hours 7/1/2017     Sig - Route: Infuse 100 mL into a venous catheter Daily. - Intravenous    magic mouthwash with nystatin oral supsension 10 mL 10 mL Every 4 Hours PRN 7/1/2017     Sig - Route: Swish and swallow 10 mL Every 4 (Four) Hours As Needed for Mouth Pain. - Swish & Swallow    magnesium hydroxide (MILK OF MAGNESIA) suspension 2400 mg/10mL 10 mL 10 mL Daily PRN 7/1/2017     Sig - Route: Take 10 mL by mouth Daily As Needed for Constipation. - Oral    megestrol (MEGACE) 40 MG/ML suspension 400 mg 400 mg 2 Times Daily 7/4/2017     Sig - Route: Take 10 mL by mouth 2 (Two) Times a Day. - Oral    Morphine sulfate (PF) injection 2 mg 2 mg Every 4 Hours PRN 7/1/2017 7/11/2017    Sig - Route: Infuse 1 mL into a venous catheter Every 4 (Four) Hours As Needed for Severe Pain (7-10). - Intravenous    ondansetron (ZOFRAN) injection 4 mg 4 mg Every 6 Hours PRN 7/3/2017     Sig - Route: Infuse 2 mL into a venous catheter Every 6 (Six) Hours As Needed for Nausea or Vomiting. - Intravenous    pantoprazole (PROTONIX) EC tablet 40 mg 40 mg Daily 7/2/2017     Sig - Route: Take 1 tablet by mouth Daily. - Oral    Pharmacy to Dose enoxaparin (LOVENOX)  Continuous PRN 7/1/2017     Sig - Route: Continuous As Needed for Consult (Treatment of PE, lovenox to coumadin bridge). - Does not apply    Pharmacy to dose warfarin  Continuous PRN 7/1/2017     Sig - Route: Continuous As Needed for Consult (Dose for PE treatment). - Does not apply    predniSONE (DELTASONE) tablet 5 mg 5 mg Daily 7/2/2017     Sig - Route: Take 1 tablet by mouth Daily. - Oral    senna (SENOKOT) tablet 8.6 mg 1 tablet Nightly 7/1/2017     Sig - Route: Take 1 tablet by mouth Every Night. - Oral    sodium chloride 0.9 % flush 1-10 mL 1-10 mL As Needed 7/1/2017     Sig - Route: Infuse 1-10 mL into a venous catheter As Needed for  Line Care. - Intravenous    sodium chloride 0.9 % flush 10 mL 10 mL As Needed 7/1/2017     Sig - Route: Infuse 10 mL into a venous catheter As Needed for Line Care. - Intravenous    Cosign for Ordering: Accepted by Aneesh Henry MD on 7/1/2017  4:29 PM    sodium chloride 0.9 % infusion 50 mL/hr Continuous 7/1/2017     Sig - Route: Infuse 50 mL/hr into a venous catheter Continuous. - Intravenous    warfarin (COUMADIN) tablet 2 mg 2 mg Daily Warfarin 7/4/2017     Sig - Route: Take 1 tablet by mouth Daily. - Oral    warfarin (COUMADIN) tablet 5 mg (Discontinued) 5 mg Daily Warfarin 7/2/2017 7/4/2017    Sig - Route: Take 1 tablet by mouth Daily. - Oral             Physician Progress Notes (last 24 hours) (Notes from 7/4/2017  9:31 AM through 7/5/2017  9:31 AM)      Christopher Ortega MD at 7/4/2017 11:10 AM  Version 1 of 1                [facility]  INPATIENT PROGRESS NOTE     LOS: 3 days   Patient Care Team:  Mary Carmen Robles DO as PCP - General    Chief Complaint:  Pulmonary embolism on right      Subjective     Interval History:     Patient Complaints: Patient seen and examined.  Patient continues to complain of having shortness of air and weakness.    History taken from: Patient.    Review of Systems:    Review of Systems   Constitutional: Positive for fatigue. Negative for fever.   Respiratory: Positive for cough and shortness of breath. Negative for choking and wheezing.    Cardiovascular: Positive for chest pain and leg swelling. Negative for palpitations.   Gastrointestinal: Negative for abdominal distention, abdominal pain, constipation, diarrhea, nausea and vomiting.   Musculoskeletal: Positive for back pain and neck pain.   Skin: Positive for pallor.   Neurological: Positive for dizziness, weakness and light-headedness.   Psychiatric/Behavioral: Negative for agitation, behavioral problems and confusion.   All other systems reviewed and are  negative.        Objective     Vital Signs  Temp:  [97.3 °F (36.3 °C)-97.8 °F (36.6 °C)] 97.3 °F (36.3 °C)  Heart Rate:  [] 98  Resp:  [16-22] 20  BP: (107-124)/(53-70) 123/60    Physical Exam:   Physical Exam   Constitutional: She is oriented to person, place, and time. She appears well-developed and well-nourished. No distress.   HENT:   Head: Normocephalic and atraumatic.   Eyes: EOM are normal. Pupils are equal, round, and reactive to light. No scleral icterus.   Neck: Normal range of motion. Neck supple. No thyromegaly present.   Cardiovascular: Normal rate.    Pulmonary/Chest: Effort normal. No respiratory distress. She has no wheezes. She has rales.   Abdominal: Soft. Bowel sounds are normal. She exhibits no distension. There is no tenderness.   Musculoskeletal: She exhibits edema (LLE ) and tenderness (LLE).   Neurological: She is alert and oriented to person, place, and time. No cranial nerve deficit.   Skin: Skin is dry. No erythema. There is pallor.   Psychiatric: She has a normal mood and affect. Her behavior is normal.   Vitals reviewed.         Results Review:       Results from last 7 days  Lab Units 07/04/17 0610 07/02/17 0520 07/01/17  1729   SODIUM mmol/L 138 142 141   POTASSIUM mmol/L 4.6 4.3 3.8   CHLORIDE mmol/L 107 110 109   CO2 mmol/L 23.0 22.0 21.0*   BUN mg/dL 7 13 15   CREATININE mg/dL 0.79 0.90 0.95   GLUCOSE mg/dL 81 83 115*   CALCIUM mg/dL 8.2* 8.3* 8.5   BILIRUBIN mg/dL 0.2  --  0.3   ALK PHOS U/L 55  --  69   ALT (SGPT) U/L 25  --  31   AST (SGOT) U/L 30  --  26         Results from last 7 days  Lab Units 07/04/17  0610   MAGNESIUM mg/dL 1.4*         Results from last 7 days  Lab Units 07/04/17  0610 07/02/17  0520 07/01/17  1613   WBC 10*3/mm3 7.79 7.87 8.50   HEMOGLOBIN g/dL 9.6* 9.7* 12.0   HEMATOCRIT % 29.4* 29.6* 38.0   PLATELETS 10*3/mm3 295 252 250       Lab Results   Component Value Date    CKTOTAL 77 07/01/2017    CKMB 2.35 07/01/2017    TROPONINI 0.017 07/01/2017        CO2   Date Value Ref Range Status   07/04/2017 23.0 22.0 - 31.0 mmol/L Final         Results from last 7 days  Lab Units 07/04/17  0610 07/03/17  0528 07/02/17  1746 07/02/17  1304 07/01/17  1613   INR  2.21* 1.32* 1.02 1.00 1.07        Imaging Results (last 7 days)     Procedure Component Value Units Date/Time    XR Abdomen 2 View With Chest 1 View [[id number]] Collected:  07/01/17 1733     Updated:  07/01/17 1751    Narrative:         EXAMINATION:  Acute abdominal series              VIEWS:  Chest:  1 ; Abdo:  2   DATE/TIME:     7/1/2017 5:48 PM CDT      INDICATION:  Fatigue, diffuse abdominal pain               COMPARISON EXAMINATION:    none                              FINDINGS:                             Chest:      heart size: within normal limits      mediastinal contour: within normal limits       lungs: no evidence of focal air space disease, grossly  negative      pleura:  no pleural fluid      osseous: Diffuse osteopenia, 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      misc:    Abdomen:      bowel gas pattern: nonobstructive      free air:  none visualized      calculi:   none visualized      osseous:  Multilevel vertebral body compression deformities.      misc:  Large amount of stool in the colon                            Impression:       CONCLUSION:        1. No gross evidence of an acute abdominal process.  2. Large amount of stool in the colon, consider  constipation/impaction..                                If pain or symptoms persist beyond reasonable expectations, a  contrast enhanced CT examination is suggested, as is deemed  clinical appropriate.                       Electronically signed by:  DEVENDRA Garner MD  7/1/2017 5:50 PM  CDT Workstation: [id number]    CT Angiogram Chest With Contrast [[id number]] Collected:  07/01/17 1753     Updated:  07/01/17 1822    Narrative:         EXAM:  Computed Tomography with CTA         REGION:  Chest       INDICATION:    dyspnea     - rule  out pulmonary embolism       CLINICAL HISTORY:  CORRELATIVE IMAGING:  None                         TECHNIQUE:     - PE / vascular protocol     - reconstructions:  axial, coronal, sagittal, obliques     - computer-generated 3D reconstructions (MIPS) were performed.     - contrast:  intravenous Isovue 370, 56 mL                                 This exam was performed according to the departmental  dose-optimization program which includes automated exposure  control, adjustment of the mA and/or kV according to patient size  and/or use of iterative reconstruction technique.         COMMENTS:    - Pulmonary arterial system:      - Main pulmonary artery trunk:  negative      - Left, right main pulmonary arteries: Nonocclusive thrombus  in the right main      - Lobar arteries: Nonocclusive thrombus in the right lower  lobe , nonocclusive thrombus in the right upper lobe      - Segmental arteries: grossly negative.      - Systemic vascularity (as visualized):        - Aorta:  grossly negative / normal caliber / no dissection        - roots of great vessels:  grossly negative / normal  caliber        - SVC / IVC:  grossly negative / normal caliber     - Misc (limited visualization):      - pulmonary parenchyma:  Scattered airspace changes in the  right base.      - pleura:  negative      - mediastinal / fred:  negative      - neck, inferior:  grossly wnl      - subdiaphragmatic structures:  grossly negative (limited  evaluation)       - osseous:      - misc:       .        Impression:       CONCLUSION:          1.  Nonocclusive intraluminal thrombus primarily in the right  main pulmonary artery, right lower lobe artery, and right upper  lobe artery            2.  No evidence of pathology associated with the visualized  aorta.           3. Patchy airspace changes in the right base.    Critical result alert:  ------------------------  The results of examination have been discussed with the referring  healthcare provider,   Elaine Melendrez, with read back,  immediately following interpretation the examination on 7/1/17 at  19:20 hours.                                    Electronically signed by:  DEVENDRA Garner MD  7/1/2017 6:21 PM  CDT Workstation: [id number]    US Venous Doppler Lower Extremity Left (duplex) [[id number]] Collected:  07/02/17 0832     Updated:  07/02/17 0914    Narrative:         PROCEDURE: Left lower extremity venous duplex    COMPARISON: None    HISTORY: Left lower extremity pain and swelling    FINDINGS: Realtime grayscale and color-flow imaging was performed  of the left lower extremity.    There is visualized thrombus in the proximal, mid, and distal  segments of the left femoral vein, and in the left popliteal  vein. The calf vessels are not adequately visualized. The common  femoral vein, greater saphenous vein, and deep femoral vein  appear patent.      Impression:       CONCLUSION:   Positive for DVT in the left femoral and popliteal veins. Patient  has a known pulmonary embolism.    Critical results discussed with EFRA DUVALL on 7/2/2017  9:10 AM CDT    Electronically signed by:  Brandon Kruger MD  7/2/2017 9:13 AM CDT  Workstation: [id number]                           Urine Culture   Date Value Ref Range Status   07/01/2017 Culture in progress  Final   07/01/2017 Normal Urogenital Kaila  Final           Medication Review:   Current Facility-Administered Medications   Medication Dose Route Frequency Provider Last Rate Last Dose   • amitriptyline (ELAVIL) tablet 10 mg  10 mg Oral Nightly Efra Duvall MD   10 mg at 07/03/17 2011   • artificial tears ophthalmic ointment   Both Eyes PRN Efra Duvall MD       • aspirin chewable tablet 81 mg  81 mg Oral Daily Efra Duvall MD   81 mg at 07/04/17 0802   • enoxaparin (LOVENOX) syringe 50 mg  1 mg/kg Subcutaneous Q12H Efra Duvall MD   50 mg at 07/04/17 0615   • ferrous sulfate EC tablet 324 mg  324 mg Oral Daily With Breakfast Efra RAMSAY  MD [place]   324 mg at 07/04/17 0802   • hydrALAZINE (APRESOLINE) injection 10 mg  10 mg Intravenous Q6H PRN Rodrigo Duvall MD       • HYDROcodone-acetaminophen (NORCO)  MG per tablet 1 tablet  1 tablet Oral Q6H PRN Rodrigo Duvall MD   1 tablet at 07/04/17 0623   • ipratropium-albuterol (DUO-NEB) nebulizer solution 3 mL  3 mL Nebulization 4x Daily - RT Rodrigo Duvall MD   3 mL at 07/04/17 1057   • levoFLOXacin (LEVAQUIN) 500 mg/100 mL D5W (premix) (LEVAQUIN) 500 mg  500 mg Intravenous Q24H Favio Matt MD   500 mg at 07/03/17 2203   • magic mouthwash with nystatin oral supsension 10 mL  10 mL Swish & Swallow Q4H PRN Rodrigo Duvall MD       • magnesium hydroxide (MILK OF MAGNESIA) suspension 2400 mg/10mL 10 mL  10 mL Oral Daily PRN Rodrigo Duvall MD   10 mL at 07/03/17 1151   • megestrol (MEGACE) 40 MG/ML suspension 400 mg  400 mg Oral BID Christopher Ortega MD       • Morphine sulfate (PF) injection 2 mg  2 mg Intravenous Q4H PRN Rodrigo Duvall MD       • ondansetron (ZOFRAN) injection 4 mg  4 mg Intravenous Q6H PRN Christopher Ortega MD       • pantoprazole (PROTONIX) EC tablet 40 mg  40 mg Oral Daily Rodrigo Duvall MD   40 mg at 07/04/17 0802   • Pharmacy to Dose enoxaparin (LOVENOX)   Does not apply Continuous PRN Rodrigo Duvall MD       • Pharmacy to dose warfarin   Does not apply Continuous PRN Rodrigo Duvall MD       • predniSONE (DELTASONE) tablet 5 mg  5 mg Oral Daily Rodrigo Duvall MD   5 mg at 07/04/17 0802   • senna (SENOKOT) tablet 8.6 mg  1 tablet Oral Nightly Rodrigo Duvall MD   8.6 mg at 07/03/17 2011   • sodium chloride 0.9 % flush 1-10 mL  1-10 mL Intravenous PRN Rodrigo Duvall MD       • sodium chloride 0.9 % flush 10 mL  10 mL Intravenous PRN Aneesh Henry MD       • sodium chloride 0.9 % infusion  50 mL/hr Intravenous Continuous Ron Smith MD 50 mL/hr at 07/03/17 2010 50 mL/hr at 07/03/17 2010   • warfarin (COUMADIN) tablet 5  mg  5 mg Oral Daily Ron Smith MD   5 mg at 07/03/17 4818         Assessment/Plan     Principal Problem:    Pulmonary embolism on right  Active Problems:    Acute deep vein thrombosis (DVT) of popliteal vein of left lower extremity    UTI (urinary tract infection)    Acute deep vein thrombosis (DVT) of left femoral vein          -Continue with anticoagulation.  -PTOT to initiated.  -DVT and GI prophylaxis in place.  -Continue other management as prior.  -We'll continue monitoring patient in hospital setting and treat patient as course dictates.          This document has been electronically signed by Christopher Ortega MD on July 4, 2017 11:10 AM        EMR Dragon/Transcription disclaimer:   Much of this encounter note is an electronic transcription/translation of spoken language to printed text. The electronic translation of spoken language may permit erroneous, or at times, nonsensical words or phrases to be inadvertently transcribed; Although I have reviewed the note for such errors, some may still exist.               Electronically signed by Christopher Ortega MD at 7/4/2017  1:41 PM        Consult Notes (all)     No notes of this type exist for this encounter.        Physical Therapy Notes (last 24 hours) (Notes from 7/4/2017  9:31 AM through 7/5/2017  9:31 AM)     No notes of this type exist for this encounter.        Occupational Therapy Notes (last 24 hours) (Notes from 7/4/2017  9:31 AM through 7/5/2017  9:31 AM)     No notes of this type exist for this encounter.

## 2017-07-05 NOTE — DISCHARGE INSTRUCTIONS
Discharge the patient to home    Diet- Heart Healthy Diet  Activity - Ad.papi with fall precautions.  Medications- See the med rec done at the time of discharge.    Follow up with  1. PCP in 2-3 days for post discharge follow up       Patient was asked to call or return if the temperatures greater than 100.4, if there is worsening nausea, vomiting, chest pain, shortness of breath, abdominal pain or any other concerns.

## 2017-07-05 NOTE — THERAPY DISCHARGE NOTE
Acute Care - Physical Therapy Discharge Summary  [facility]       Patient Name: Stephani Bush  : 1926  MRN: [id number]    Today's Date: 2017  Onset of Illness/Injury or Date of Surgery Date: 17    Date of Referral to PT: 17  Referring Physician: Marisol      Admit Date: 2017      PT Recommendation and Plan    Visit Dx:    ICD-10-CM ICD-9-CM   1. Other acute pulmonary embolism without acute cor pulmonale I26.99    2. Sepsis secondary to UTI A41.9 038.9    N39.0 995.91     599.0   3. Impaired functional mobility, balance, gait, and endurance Z74.09 V49.89   4. Pulmonary embolism on right I26.99 415.19   5. Acute deep vein thrombosis (DVT) of left femoral vein I82.412 453.41             Outcome Measures       17 1700          How much help from another person do you currently need...    Turning from your back to your side while in flat bed without using bedrails? 3  -GB      Moving from lying on back to sitting on the side of a flat bed without bedrails? 3  -GB      Moving to and from a bed to a chair (including a wheelchair)? 2  -GB      Standing up from a chair using your arms (e.g., wheelchair, bedside chair)? 1  -GB      Climbing 3-5 steps with a railing? 1  -GB      To walk in hospital room? 1  -GB      AM-PAC 6 Clicks Score 11  -GB      Functional Assessment    Outcome Measure Options AM-PAC 6 Clicks Basic Mobility (PT)  -GB        User Key  (r) = Recorded By, (t) = Taken By, (c) = Cosigned By    Initials Name Provider Type    TREVOR Blake, PT Physical Therapist                      IP PT Goals       17 1657          Bed Mobility PT LTG    Bed Mobility PT LTG, Date Established 17  -GB      Bed Mobility PT LTG, Time to Achieve by discharge  -GB      Bed Mobility PT LTG, Activity Type all bed mobility  -GB      Bed Mobility PT LTG, [place] Level conditional independence  -GB      Bed Mobility PT LTG, Outcome goal not met  -GB      Transfer  Training PT LTG    Transfer Training PT LTG, Date Established 07/03/17  -GB      Transfer Training PT LTG, Activity Type bed to chair /chair to bed;sit to stand/stand to sit  -GB      Transfer Training PT LTG, [place] Level conditional independence  -GB      Transfer Training PT LTG, Assist Device walker, rolling  -GB      Transfer Training PT LTG, Outcome goal not met  -GB      Gait Training PT LTG    Gait Training Goal PT LTG, Date Established 07/03/17  -GB      Gait Training Goal PT LTG, Time to Achieve by discharge  -GB      Gait Training Goal PT LTG, [place] Level conditional independence  -GB      Gait Training Goal PT LTG, Assist Device walker, rolling  -GB      Gait Training Goal PT LTG, Distance to Achieve able to do standing pivot transfer w/ min assist x 1   -GB      Gait Training Goal PT LTG, Outcome goal not met  -GB      Dynamic Sitting Balance PT LTG    Dynamic Sitting Balance PT LTG, Date Established 07/03/17  -GB      Dynamic Sitting Balance PT LTG, Time to Achieve 4 days  -GB      Dynamic Sitting Balance PT LTG, [place] Level conditional independence  -GB      Dynamic Sitting Balance PT LTG, Additional Goal able to sit indep of UE support  -GB      Dynamic Sitting Balance PT LTG, Outcome goal not met  -GB        User Key  (r) = Recorded By, (t) = Taken By, (c) = Cosigned By    Initials Name Provider Type    TREVOR Blake, PT Physical Therapist              PT Discharge Summary  Anticipated Discharge Disposition: skilled nursing facility  Reason for Discharge: Discharge from facility, Per MD order  Outcomes Achieved: Unable to make functional progress toward goals at this time  Discharge Destination: SNF      Ulices Morales, PTA   7/5/2017

## 2017-07-05 NOTE — DISCHARGE SUMMARY
Date of Discharge:  7/5/2017    Discharge Diagnosis: Pulmonary embolism on right     Presenting Problem/History of Present Illness  Sepsis secondary to UTI [A41.9, N39.0]  Other acute pulmonary embolism without acute cor pulmonale [I26.99]     Hospital Course  This 89-year-old  female reported to the emergency department with complaints of worsening shortness of air. Patient has a history of left leg swelling and pain for the last 3 months. Over the past 2-3 days patient has become short of air. Today the shortness of air became severe enough that she reported to the emergency department. Patient was diagnosed with a right-sided pulmonary embolism without obstruction. Ultrasound of the left lower extremity pending. Patient and family deny any history of bleeding disorders or GI bleeds, however the patient is elderly and has experienced strokes and falls in the past. This time we will initiate a Lovenox to Coumadin bridge. Patient denies fever, chills, hematochezia, melena, hematemesis, hematuria. Denies chest pain. Endorses shortness of air. No abdominal pain, though abdominal films show constipation. Patient does admit to constipation. No syncope, presyncope, or dizziness.  Patient was treated with full anticoagulation.  Patient's INR was monitored closely.  Patient INR was improved.  However patient's condition remained weak and patient needed physical therapy.  Patient is currently being discharged to nursing home for rehabilitation along with monitoring of INR and close treatment for anticoagulation.  Patient's clinically stable, able To make her to be discharged.      Procedures Performed         Consults:   Consults     Date and Time Order Name Status Description    7/1/2017 1827 Hospitalist (on-call MD unless specified)            Pertinent Test Results:   Lab Results (last 24 hours)     Procedure Component Value Units Date/Time    POC Glucose Fingerstick [[id number]]  (Normal) Collected:  07/04/17  1625    Specimen:  Blood Updated:  07/04/17 1639     Glucose 113 mg/dL       RN NotifiedMeter: [id number]: [id number] BRIANNA DENISE       POC Glucose Fingerstick [[id number]]  (Normal) Collected:  07/04/17 2049    Specimen:  Blood Updated:  07/04/17 2119     Glucose 100 mg/dL       Meter: [id number]: [id number] ORQUIDEA CRAFT       POC Glucose Fingerstick [[id number]]  (Normal) Collected:  07/05/17 0631    Specimen:  Blood Updated:  07/05/17 0649     Glucose 87 mg/dL       Meter: [id number]: [id number] ORQUIDEA CRAFT       CBC & Differential [[id number]] Collected:  07/05/17 0718    Specimen:  Blood Updated:  07/05/17 0742    Narrative:       The following orders were created for panel order CBC & Differential.  Procedure                               Abnormality         Status                     ---------                               -----------         ------                     CBC Auto Differential[[id number]]        Abnormal            Final result                 Please view results for these tests on the individual orders.    CBC Auto Differential [[id number]]  (Abnormal) Collected:  07/05/17 0718    Specimen:  Blood Updated:  07/05/17 0742     WBC 8.52 10*3/mm3      RBC 3.25 (L) 10*6/mm3      Hemoglobin 10.1 (L) g/dL      Hematocrit 31.9 (L) %      MCV 98.2 (H) fL      MCH 31.1 pg      MCHC 31.7 g/dL      RDW 14.2 %      RDW-SD 50.5 (H) fl      MPV 9.7 fL      Platelets 323 10*3/mm3      Neutrophil % 78.9 %      Lymphocyte % 12.3 %      Monocyte % 7.6 %      Eosinophil % 0.7 %      Basophil % 0.4 %      Immature Grans % 0.1 %      Neutrophils, Absolute 6.72 10*3/mm3      Lymphocytes, Absolute 1.05 10*3/mm3      Monocytes, Absolute 0.65 10*3/mm3      Eosinophils, Absolute 0.06 10*3/mm3      Basophils, Absolute 0.03 10*3/mm3      Immature Grans, Absolute 0.01 10*3/mm3     Comprehensive Metabolic Panel [[id number]]  (Abnormal) Collected:  07/05/17 0718    Specimen:  Blood Updated:   07/05/17 0753     Glucose 78 mg/dL      BUN 8 mg/dL      Creatinine 0.81 mg/dL      Sodium 140 mmol/L      Potassium 4.0 mmol/L      Chloride 109 mmol/L      CO2 22.0 mmol/L      Calcium 8.3 (L) mg/dL      Total Protein 5.6 (L) g/dL      Albumin 3.00 (L) g/dL      ALT (SGPT) 32 U/L      AST (SGOT) 30 U/L      Alkaline Phosphatase 59 U/L      Total Bilirubin 0.2 mg/dL      eGFR Non African Amer 66 mL/min/1.73      Globulin 2.6 gm/dL      A/G Ratio 1.2 g/dL      BUN/Creatinine Ratio 9.9     Anion Gap 9.0 mmol/L     Narrative:       The MDRD GFR formula is only valid for adults with stable renal function between ages 18 and 70.    Magnesium [[id number]]  (Abnormal) Collected:  07/05/17 0718    Specimen:  Blood Updated:  07/05/17 0753     Magnesium 1.5 (L) mg/dL     Protime-INR [[id number]]  (Abnormal) Collected:  07/05/17 0718    Specimen:  Blood Updated:  07/05/17 0820     Protime 33.1 (H) Seconds      INR 3.19 (H)    Narrative:       Therapeutic range for most indications is 2.0-3.0 INR,  or 2.5-3.5 for mechanical heart valves.    POC Glucose Fingerstick [[id number]]  (Normal) Collected:  07/05/17 1024    Specimen:  Blood Updated:  07/05/17 1036     Glucose 105 mg/dL       Meter: [id number]: [id number] BRIANNA CAMELIA               Condition on Discharge:  stable  Vital Signs  Temp:  [97 °F (36.1 °C)-98 °F (36.7 °C)] 97 °F (36.1 °C)  Heart Rate:  [] 100  Resp:  [16-20] 18  BP: (108-130)/(55-72) 114/56    Physical Exam:   Physical Exam   Constitutional: She appears well-developed and well-nourished.   HENT:   Head: Normocephalic and atraumatic.   Nose: Nose normal.   Eyes: Conjunctivae and EOM are normal. Pupils are equal, round, and reactive to light.   Neck: Normal range of motion. Neck supple. No JVD present. No tracheal deviation present. No thyromegaly present.   Cardiovascular: Normal rate, regular rhythm, normal heart sounds and intact distal pulses.    Pulmonary/Chest: Effort normal. No  respiratory distress. She has no wheezes. She has rales. She exhibits no tenderness.   Abdominal: Soft. Bowel sounds are normal. She exhibits no distension. There is no tenderness. There is no rebound and no guarding.   Musculoskeletal: Normal range of motion. She exhibits no edema.   Lymphadenopathy:     She has no cervical adenopathy.   Neurological: She is alert. She has normal reflexes. No cranial nerve deficit.   Skin: Skin is warm and dry.   Intact   Psychiatric: She has a normal mood and affect.   Nursing note and vitals reviewed.      LABS Reviewed Prior to Discharge:    Results from last 7 days  Lab Units 07/05/17  0718 07/04/17  0610 07/02/17  0520 07/01/17  1729   SODIUM mmol/L 140 138 142 141   POTASSIUM mmol/L 4.0 4.6 4.3 3.8   CHLORIDE mmol/L 109 107 110 109   CO2 mmol/L 22.0 23.0 22.0 21.0*   BUN mg/dL 8 7 13 15   CREATININE mg/dL 0.81 0.79 0.90 0.95   GLUCOSE mg/dL 78 81 83 115*   CALCIUM mg/dL 8.3* 8.2* 8.3* 8.5   BILIRUBIN mg/dL 0.2 0.2  --  0.3   ALK PHOS U/L 59 55  --  69   ALT (SGPT) U/L 32 25  --  31   AST (SGOT) U/L 30 30  --  26         Results from last 7 days  Lab Units 07/05/17  0718 07/04/17  0610   MAGNESIUM mg/dL 1.5* 1.4*         Results from last 7 days  Lab Units 07/05/17 0718 07/04/17  0610 07/02/17  0520 07/01/17  1613   WBC 10*3/mm3 8.52 7.79 7.87 8.50   HEMOGLOBIN g/dL 10.1* 9.6* 9.7* 12.0   HEMATOCRIT % 31.9* 29.4* 29.6* 38.0   PLATELETS 10*3/mm3 323 295 252 250         Results from last 7 days  Lab Units 07/05/17  0718 07/04/17  0610 07/03/17  0528 07/02/17  1746 07/02/17  1304 07/01/17  1613   INR  3.19* 2.21* 1.32* 1.02 1.00 1.07       Discharge Disposition      Discharge Medications   Stephani Bush   Home Medication Instructions NIKKI:[id number]    Printed on:07/05/17 8280   Medication Information                      acetaminophen (TYLENOL) 650 MG 8 hr tablet  Take 650 mg by mouth Every 4 (Four) Hours As Needed for mild pain (1-3).             amitriptyline (ELAVIL)  10 MG tablet  Take 10 mg by mouth Every Night.             ARTIFICIAL TEARS 0.1-0.3 % solution  Apply 1 drop to eye 2 (Two) Times a Day.             aspirin 81 MG tablet  Take 81 mg by mouth Daily.             Calcium Carbonate-Vitamin D (CALCIUM-VITAMIN D) 500-200 MG-UNIT per tablet  Take 1 tablet by mouth Daily.             cholecalciferol (VITAMIN D3) 1000 UNITS tablet  Take 1,000 Units by mouth Daily.             clopidogrel (PLAVIX) 75 MG tablet  Take 75 mg by mouth. 1 tablet every Monday, Wednesday, and Friday Oral             docusate sodium (COLACE) 100 MG capsule  Take 100 mg by mouth 2 (Two) Times a Day.             ferrous sulfate 325 (65 FE) MG tablet  Take 325 mg by mouth Daily With Breakfast.             folic acid-pyridoxine-cyanocobalamin (FOLBIC) 2.5-25-2 MG tablet tablet  Take 1 tablet by mouth Daily.             HYDROcodone-acetaminophen (NORCO)  MG per tablet  Take 1 tablet by mouth Every 6 (Six) Hours As Needed for Moderate Pain (4-6).             HYDROcodone-acetaminophen (NORCO) 7.5-325 MG per tablet  Take 1 tablet by mouth Every 4 (Four) Hours As Needed for moderate pain (4-6).             lisinopril-hydrochlorothiazide (PRINZIDE,ZESTORETIC) 20-25 MG per tablet  Take 1 tablet by mouth Daily.             megestrol (MEGACE) 40 MG tablet  Take 40 mg by mouth Daily.             Multiple Vitamins-Minerals (MULTIVITAL PO)  Take 1 tablet by mouth Daily.             ondansetron (ZOFRAN) 4 MG tablet  Take 1 tablet by mouth Every 8 (Eight) Hours As Needed for Nausea or Vomiting.             pantoprazole (PROTONIX) 40 MG EC tablet  Take 40 mg by mouth Daily.             POTASSIUM CHLORIDE ER PO  Take  by mouth.             predniSONE (DELTASONE) 5 MG tablet  Take 5 mg by mouth Daily.             senna (SENOKOT) 8.6 MG tablet tablet  Take  by mouth Every Night.             vitamin C (ASCORBIC ACID) 500 MG tablet  Take 500 mg by mouth Every Night.                 Discharge Diet:     Activity at  Discharge:     Follow-up Appointments  No future appointments.  Additional Instructions for the Follow-ups that You Need to Schedule     Ambulatory Referral to Occupational Therapy    As directed        Ambulatory Referral to Physical Therapy Evaluate and treat    As directed    Specialty modality needed?:  Evaluate and treat                 Test Results Pending at Discharge           This document has been electronically signed by Christopher Ortega MD on July 5, 2017 11:46 AM        Time: Discharge 35 min        EMR Dragon/Transcription disclaimer:   Much of this encounter note is an electronic transcription/translation of spoken language to printed text. The electronic translation of spoken language may permit erroneous, or at times, nonsensical words or phrases to be inadvertently transcribed; Although I have reviewed the note for such errors, some may still exist.

## 2017-07-05 NOTE — PLAN OF CARE
Problem: Patient Care Overview (Adult)  Goal: Plan of Care Review  Outcome: Ongoing (interventions implemented as appropriate)    07/04/17 1541 07/04/17 2030 07/05/17 0507   Coping/Psychosocial Response Interventions   Plan Of Care Reviewed With --  patient --    Patient Care Overview   Progress no change --  --    Outcome Evaluation   Outcome Summary/Follow up Plan --  --  Pts heart rate is still 100-110, hoping to go to nursing home in [place] today for therapy before returning home to family, pt has no complaints at this time, will continue to monitor.       Goal: Adult Individualization and Mutuality  Outcome: Ongoing (interventions implemented as appropriate)    Problem: VTE, DVT and PE (Adult)  Goal: Signs and Symptoms of Listed Potential Problems Will be Absent or Manageable (VTE, DVT and PE)  Outcome: Ongoing (interventions implemented as appropriate)